# Patient Record
Sex: FEMALE | Race: OTHER | NOT HISPANIC OR LATINO | Employment: OTHER | URBAN - METROPOLITAN AREA
[De-identification: names, ages, dates, MRNs, and addresses within clinical notes are randomized per-mention and may not be internally consistent; named-entity substitution may affect disease eponyms.]

---

## 2023-03-06 PROBLEM — H04.569 PUNCTAL STENOSIS, ACQUIRED: Status: ACTIVE | Noted: 2023-03-06

## 2023-03-06 PROBLEM — M77.8 TENDONITIS OF ELBOW, RIGHT: Status: ACTIVE | Noted: 2023-03-06

## 2023-03-06 PROBLEM — S49.90XA SHOULDER INJURY: Status: ACTIVE | Noted: 2023-03-06

## 2023-03-06 PROBLEM — R60.0 LOWER LEG EDEMA: Status: ACTIVE | Noted: 2023-03-06

## 2023-03-06 PROBLEM — R30.0 DYSURIA: Status: ACTIVE | Noted: 2023-03-06

## 2023-03-06 PROBLEM — M25.519 SHOULDER PAIN WITH HISTORY OF REPAIR OF ROTATOR CUFF: Status: ACTIVE | Noted: 2023-03-06

## 2023-03-06 PROBLEM — R26.81 GAIT INSTABILITY: Status: ACTIVE | Noted: 2023-03-06

## 2023-03-06 PROBLEM — T14.8XXA MUSCLE STRAIN: Status: ACTIVE | Noted: 2023-03-06

## 2023-03-06 PROBLEM — E11.65 TYPE 2 DIABETES MELLITUS WITH HYPERGLYCEMIA, WITHOUT LONG-TERM CURRENT USE OF INSULIN (MULTI): Status: ACTIVE | Noted: 2023-03-06

## 2023-03-06 PROBLEM — H26.9 CATARACT: Status: ACTIVE | Noted: 2023-03-06

## 2023-03-06 PROBLEM — R05.9 COUGH: Status: ACTIVE | Noted: 2023-03-06

## 2023-03-06 PROBLEM — R39.15 URINARY URGENCY: Status: ACTIVE | Noted: 2023-03-06

## 2023-03-06 PROBLEM — R12 HEARTBURN: Status: ACTIVE | Noted: 2023-03-06

## 2023-03-06 PROBLEM — H52.209 HYPEROPIA WITH ASTIGMATISM AND PRESBYOPIA: Status: ACTIVE | Noted: 2023-03-06

## 2023-03-06 PROBLEM — R21 RASH AND NONSPECIFIC SKIN ERUPTION: Status: ACTIVE | Noted: 2023-03-06

## 2023-03-06 PROBLEM — M18.11 PRIMARY OSTEOARTHRITIS OF FIRST CARPOMETACARPAL JOINT OF RIGHT HAND: Status: ACTIVE | Noted: 2023-03-06

## 2023-03-06 PROBLEM — M25.511 CHRONIC RIGHT SHOULDER PAIN: Status: ACTIVE | Noted: 2023-03-06

## 2023-03-06 PROBLEM — I10 BENIGN ESSENTIAL HYPERTENSION: Status: ACTIVE | Noted: 2023-03-06

## 2023-03-06 PROBLEM — K57.32 DIVERTICULITIS, COLON: Status: ACTIVE | Noted: 2023-03-06

## 2023-03-06 PROBLEM — K59.00 CONSTIPATION: Status: ACTIVE | Noted: 2023-03-06

## 2023-03-06 PROBLEM — N23 KIDNEY PAIN: Status: ACTIVE | Noted: 2023-03-06

## 2023-03-06 PROBLEM — L29.9 ITCHY SKIN: Status: ACTIVE | Noted: 2023-03-06

## 2023-03-06 PROBLEM — M79.644 PAIN OF RIGHT THUMB: Status: ACTIVE | Noted: 2023-03-06

## 2023-03-06 PROBLEM — H02.60 XANTHELASMA OF EYELID: Status: ACTIVE | Noted: 2023-03-06

## 2023-03-06 PROBLEM — B37.9 CANDIDIASIS: Status: ACTIVE | Noted: 2023-03-06

## 2023-03-06 PROBLEM — H57.9 EYE PROBLEMS: Status: ACTIVE | Noted: 2023-03-06

## 2023-03-06 PROBLEM — S02.2XXA NASAL FRACTURE: Status: ACTIVE | Noted: 2023-03-06

## 2023-03-06 PROBLEM — M25.512 SHOULDER PAIN, LEFT: Status: ACTIVE | Noted: 2023-03-06

## 2023-03-06 PROBLEM — M25.562 LEFT KNEE PAIN: Status: ACTIVE | Noted: 2023-03-06

## 2023-03-06 PROBLEM — M25.612 STIFFNESS OF LEFT SHOULDER, NOT ELSEWHERE CLASSIFIED: Status: ACTIVE | Noted: 2023-03-06

## 2023-03-06 PROBLEM — S09.92XA NASAL INJURY: Status: ACTIVE | Noted: 2023-03-06

## 2023-03-06 PROBLEM — H53.9 VISUAL CHANGES: Status: ACTIVE | Noted: 2023-03-06

## 2023-03-06 PROBLEM — Z98.890 SHOULDER PAIN WITH HISTORY OF REPAIR OF ROTATOR CUFF: Status: ACTIVE | Noted: 2023-03-06

## 2023-03-06 PROBLEM — E78.5 HYPERLIPIDEMIA, MILD: Status: ACTIVE | Noted: 2023-03-06

## 2023-03-06 PROBLEM — E16.2 HYPOGLYCEMIA: Status: ACTIVE | Noted: 2023-03-06

## 2023-03-06 PROBLEM — H52.4 HYPEROPIA WITH ASTIGMATISM AND PRESBYOPIA: Status: ACTIVE | Noted: 2023-03-06

## 2023-03-06 PROBLEM — R10.9 ABDOMINAL PAIN: Status: ACTIVE | Noted: 2023-03-06

## 2023-03-06 PROBLEM — R26.2 DIFFICULTY WALKING: Status: ACTIVE | Noted: 2023-03-06

## 2023-03-06 PROBLEM — M75.02 ADHESIVE CAPSULITIS OF LEFT SHOULDER: Status: ACTIVE | Noted: 2023-03-06

## 2023-03-06 PROBLEM — H04.201 EPIPHORA, RIGHT: Status: ACTIVE | Noted: 2023-03-06

## 2023-03-06 PROBLEM — R92.8 ABNORMAL MAMMOGRAM: Status: ACTIVE | Noted: 2023-03-06

## 2023-03-06 PROBLEM — M75.102 ROTATOR CUFF TEAR, LEFT: Status: ACTIVE | Noted: 2023-03-06

## 2023-03-06 PROBLEM — H04.123 BILATERAL DRY EYES: Status: ACTIVE | Noted: 2023-03-06

## 2023-03-06 PROBLEM — R11.2 NAUSEA WITH VOMITING: Status: ACTIVE | Noted: 2023-03-06

## 2023-03-06 PROBLEM — H35.363 PERIPHERAL DRUSEN OF BOTH EYES: Status: ACTIVE | Noted: 2023-03-06

## 2023-03-06 PROBLEM — H31.8 CHOROIDAL LESION: Status: ACTIVE | Noted: 2023-03-06

## 2023-03-06 PROBLEM — H52.00 HYPEROPIA WITH ASTIGMATISM AND PRESBYOPIA: Status: ACTIVE | Noted: 2023-03-06

## 2023-03-06 PROBLEM — G89.29 CHRONIC RIGHT SHOULDER PAIN: Status: ACTIVE | Noted: 2023-03-06

## 2023-03-06 PROBLEM — H02.109 ECTROPION DUE TO LAXITY OF EYELID: Status: ACTIVE | Noted: 2023-03-06

## 2023-03-06 RX ORDER — CETIRIZINE HYDROCHLORIDE 10 MG/1
10 TABLET ORAL DAILY
COMMUNITY
End: 2023-06-15 | Stop reason: SDUPTHER

## 2023-03-06 RX ORDER — AMLODIPINE BESYLATE 5 MG/1
1 TABLET ORAL DAILY
COMMUNITY
End: 2023-04-17

## 2023-03-06 RX ORDER — ROSUVASTATIN CALCIUM 20 MG/1
1 TABLET, COATED ORAL NIGHTLY
COMMUNITY
Start: 2020-06-01 | End: 2023-05-10

## 2023-03-06 RX ORDER — GLYBURIDE 2.5 MG/1
2.5 TABLET ORAL 2 TIMES DAILY PRN
COMMUNITY
Start: 2020-11-27 | End: 2023-05-19

## 2023-03-06 RX ORDER — IBUPROFEN 200 MG
CAPSULE ORAL
COMMUNITY
Start: 2022-08-29 | End: 2023-07-28 | Stop reason: SDUPTHER

## 2023-03-06 RX ORDER — PANTOPRAZOLE SODIUM 40 MG/1
1 TABLET, DELAYED RELEASE ORAL DAILY
COMMUNITY
Start: 2021-05-13 | End: 2023-04-21

## 2023-03-06 RX ORDER — LANCETS
EACH MISCELLANEOUS
COMMUNITY
End: 2023-07-28 | Stop reason: SDUPTHER

## 2023-03-15 ENCOUNTER — TELEPHONE (OUTPATIENT)
Dept: PRIMARY CARE | Facility: CLINIC | Age: 73
End: 2023-03-15
Payer: MEDICAID

## 2023-03-16 ENCOUNTER — APPOINTMENT (OUTPATIENT)
Dept: PRIMARY CARE | Facility: CLINIC | Age: 73
End: 2023-03-16
Payer: MEDICAID

## 2023-04-16 DIAGNOSIS — I10 BENIGN ESSENTIAL HTN: Primary | ICD-10-CM

## 2023-04-17 RX ORDER — AMLODIPINE BESYLATE 5 MG/1
TABLET ORAL
Qty: 90 TABLET | Refills: 0 | Status: SHIPPED | OUTPATIENT
Start: 2023-04-17 | End: 2023-07-28 | Stop reason: SDUPTHER

## 2023-04-20 DIAGNOSIS — R12 HEARTBURN: Primary | ICD-10-CM

## 2023-04-21 RX ORDER — PANTOPRAZOLE SODIUM 40 MG/1
TABLET, DELAYED RELEASE ORAL
Qty: 90 TABLET | Refills: 0 | Status: SHIPPED | OUTPATIENT
Start: 2023-04-21 | End: 2023-06-15 | Stop reason: ALTCHOICE

## 2023-05-09 DIAGNOSIS — E78.5 HYPERLIPIDEMIA, MILD: Primary | ICD-10-CM

## 2023-05-10 RX ORDER — ROSUVASTATIN CALCIUM 20 MG/1
TABLET, COATED ORAL
Qty: 90 TABLET | Refills: 0 | Status: SHIPPED | OUTPATIENT
Start: 2023-05-10 | End: 2023-06-26

## 2023-05-18 DIAGNOSIS — E11.9 TYPE 2 DIABETES MELLITUS WITHOUT COMPLICATION, UNSPECIFIED WHETHER LONG TERM INSULIN USE (MULTI): Primary | ICD-10-CM

## 2023-05-19 RX ORDER — GLYBURIDE 2.5 MG/1
TABLET ORAL
Qty: 180 TABLET | Refills: 0 | Status: SHIPPED | OUTPATIENT
Start: 2023-05-19 | End: 2023-06-26

## 2023-06-15 ENCOUNTER — OFFICE VISIT (OUTPATIENT)
Dept: PRIMARY CARE | Facility: CLINIC | Age: 73
End: 2023-06-15
Payer: MEDICAID

## 2023-06-15 VITALS
WEIGHT: 164.6 LBS | OXYGEN SATURATION: 95 % | BODY MASS INDEX: 28.1 KG/M2 | HEART RATE: 60 BPM | HEIGHT: 64 IN | SYSTOLIC BLOOD PRESSURE: 145 MMHG | TEMPERATURE: 97.7 F | DIASTOLIC BLOOD PRESSURE: 76 MMHG

## 2023-06-15 DIAGNOSIS — Z00.00 HEALTHCARE MAINTENANCE: ICD-10-CM

## 2023-06-15 DIAGNOSIS — R12 HEARTBURN: ICD-10-CM

## 2023-06-15 DIAGNOSIS — R05.3 CHRONIC COUGH: Primary | ICD-10-CM
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
## 2023-06-15 PROCEDURE — 1159F MED LIST DOCD IN RCRD: CPT | Performed by: STUDENT IN AN ORGANIZED HEALTH CARE EDUCATION/TRAINING PROGRAM

## 2023-06-15 PROCEDURE — 99213 OFFICE O/P EST LOW 20 MIN: CPT | Performed by: STUDENT IN AN ORGANIZED HEALTH CARE EDUCATION/TRAINING PROGRAM

## 2023-06-15 PROCEDURE — 3078F DIAST BP <80 MM HG: CPT | Performed by: STUDENT IN AN ORGANIZED HEALTH CARE EDUCATION/TRAINING PROGRAM

## 2023-06-15 PROCEDURE — 1160F RVW MEDS BY RX/DR IN RCRD: CPT | Performed by: STUDENT IN AN ORGANIZED HEALTH CARE EDUCATION/TRAINING PROGRAM

## 2023-06-15 PROCEDURE — 1036F TOBACCO NON-USER: CPT | Performed by: STUDENT IN AN ORGANIZED HEALTH CARE EDUCATION/TRAINING PROGRAM

## 2023-06-15 PROCEDURE — 3077F SYST BP >= 140 MM HG: CPT | Performed by: STUDENT IN AN ORGANIZED HEALTH CARE EDUCATION/TRAINING PROGRAM

## 2023-06-15 RX ORDER — ALBUTEROL SULFATE 90 UG/1
2 AEROSOL, METERED RESPIRATORY (INHALATION) EVERY 6 HOURS PRN
Qty: 18 G | Refills: 0 | Status: SHIPPED | OUTPATIENT
Start: 2023-06-15 | End: 2023-06-15 | Stop reason: SDUPTHER

## 2023-06-15 RX ORDER — CETIRIZINE HYDROCHLORIDE 10 MG/1
10 TABLET ORAL DAILY
Qty: 90 TABLET | Refills: 1 | Status: SHIPPED | OUTPATIENT
Start: 2023-06-15 | End: 2023-07-28 | Stop reason: SDUPTHER

## 2023-06-15 RX ORDER — PANTOPRAZOLE SODIUM 40 MG/1
40 TABLET, DELAYED RELEASE ORAL 2 TIMES DAILY
Qty: 60 TABLET | Refills: 2 | Status: SHIPPED | OUTPATIENT
Start: 2023-06-15 | End: 2023-07-28 | Stop reason: SDUPTHER

## 2023-06-15 RX ORDER — ALBUTEROL SULFATE 90 UG/1
1-2 AEROSOL, METERED RESPIRATORY (INHALATION) EVERY 6 HOURS PRN
Qty: 18 G | Refills: 0 | Status: SHIPPED | OUTPATIENT
Start: 2023-06-15 | End: 2023-07-28 | Stop reason: SDUPTHER

## 2023-06-15 ASSESSMENT — ENCOUNTER SYMPTOMS
VOMITING: 0
DIZZINESS: 0
LIGHT-HEADEDNESS: 0
FEVER: 0
SHORTNESS OF BREATH: 1
COUGH: 1
NAUSEA: 1
DYSURIA: 0
WHEEZING: 0
DIAPHORESIS: 0
CHILLS: 0

## 2023-06-15 ASSESSMENT — PATIENT HEALTH QUESTIONNAIRE - PHQ9
SUM OF ALL RESPONSES TO PHQ9 QUESTIONS 1 AND 2: 0
2. FEELING DOWN, DEPRESSED OR HOPELESS: NOT AT ALL
1. LITTLE INTEREST OR PLEASURE IN DOING THINGS: NOT AT ALL

## 2023-06-15 NOTE — ASSESSMENT & PLAN NOTE
Symptoms seem most consistent with acid reflux versus gastritis versus developing ulcer. She is s/p appy and delfina. Less likely diverticulitis given location of symptoms.  Will increase pantoprazole to 40 mg twice a day.  Discussed to eat to brat diet and blander foods in the interim.  Advised to cut back on caffeine use.  She is already not using any NSAIDs, smoking, or using alcohol. She was comfortable seeing Dr. Dominguez and provided contact information to schedule.  Referral placed.  Advised ER for fevers, chills, sweats, altered mental status, melena, hematochezia, worsening abdominal pain.

## 2023-06-15 NOTE — ASSESSMENT & PLAN NOTE
X-ray of the chest ordered to evaluate cough given his longstanding.  Discussed to try taking Zyrtec 10 mg a day and appears that she was on this at some point.  We will also trial albuterol to help with cough as needed as there may be a component of reactive airway disease/asthma.  Discussed that cough may also be from acid reflux and perhaps increasing the pantoprazole may help with symptoms.  CT abdomen pelvis incidentally showed possible atelectasis versus consolidation in October 2023 and if chest x-ray is unremarkable we may need CT chest to further evaluate this.  Follow-up sooner than 3 months if symptoms persist.

## 2023-06-15 NOTE — PROGRESS NOTES
"Subjective   Patient ID: Najibeh Tannous is a 72 y.o. female who presents for Abdominal Pain.  She is having epigastric abdominal pain described as burning and radiating to the back. No foods associated with symptoms, they don't make better or worse. She drinks coffee. She doesn't eat spicy foods. She doesn't use NSAIDs. She is on protonix 40mg/day but hasn't been helping.    15 years ago she saw Dr. Dominguez at [facility] for this for similar symptoms and dx with ulcer. Feels pantoprazole hasn't been working. Feels since taking cholesterol medicine she has had some trouble breathing. Some nausea that's worse at night. No vomiting. No melena or hematochezia. BM regular. Takes rosuvastatin at night. Doesn't have GI doctor but saw Dr. Dominguez in the past. Coughing with deep breaths and has had this for a while but seems worse over last 3-4 months. Denies nasal congestion at the moment.         Review of Systems   Constitutional:  Negative for chills, diaphoresis and fever.   HENT:  Negative for hearing loss.    Eyes:  Negative for visual disturbance.   Respiratory:  Positive for cough and shortness of breath. Negative for wheezing.    Cardiovascular:  Negative for chest pain and leg swelling.   Gastrointestinal:  Positive for nausea. Negative for vomiting.   Genitourinary:  Negative for dysuria.   Neurological:  Negative for dizziness and light-headedness.       /76   Pulse 60   Temp 36.5 °C (97.7 °F)   Ht 1.626 m (5' 4\")   Wt 74.7 kg (164 lb 9.6 oz)   SpO2 95%   BMI 28.25 kg/m²     Objective   Physical Exam  Vitals reviewed.   HENT:      Head: Normocephalic.   Cardiovascular:      Rate and Rhythm: Normal rate and regular rhythm.   Pulmonary:      Effort: Pulmonary effort is normal. No respiratory distress.      Breath sounds: Normal breath sounds.   Abdominal:      General: Bowel sounds are normal. There is no distension.      Palpations: Abdomen is soft. There is no mass.      Tenderness: There is abdominal " tenderness (EPigastric). There is no guarding or rebound.      Comments: Negative mascorro sign, negative mcburney's point   Musculoskeletal:         General: No deformity.   Skin:     Coloration: Skin is not jaundiced.   Neurological:      Mental Status: She is alert.   Psychiatric:         Mood and Affect: Mood normal.         Behavior: Behavior normal.         Assessment/Plan   Problem List Items Addressed This Visit       Cough - Primary     X-ray of the chest ordered to evaluate cough given his longstanding.  Discussed to try taking Zyrtec 10 mg a day and appears that she was on this at some point.  We will also trial albuterol to help with cough as needed as there may be a component of reactive airway disease/asthma.  Discussed that cough may also be from acid reflux and perhaps increasing the pantoprazole may help with symptoms.  CT abdomen pelvis incidentally showed possible atelectasis versus consolidation in October 2023 and if chest x-ray is unremarkable we may need CT chest to further evaluate this.  Follow-up sooner than 3 months if symptoms persist.         Relevant Medications    cetirizine (ZyrTEC) 10 mg tablet    albuterol 90 mcg/actuation inhaler    Other Relevant Orders    XR chest 2 views    Heartburn     Symptoms seem most consistent with acid reflux versus gastritis versus developing ulcer. She is s/p appy and delfina. Less likely diverticulitis given location of symptoms.  Will increase pantoprazole to 40 mg twice a day.  Discussed to eat to brat diet and blander foods in the interim.  Advised to cut back on caffeine use.  She is already not using any NSAIDs, smoking, or using alcohol. She was comfortable seeing Dr. Dominguez and provided contact information to schedule.  Referral placed.  Advised ER for fevers, chills, sweats, altered mental status, melena, hematochezia, worsening abdominal pain.         Relevant Medications    pantoprazole (ProtoNix) 40 mg EC tablet    Healthcare maintenance      F/u 3 months CPE         Relevant Orders    Follow Up In [facility] - PCP

## 2023-06-19 ENCOUNTER — TELEPHONE (OUTPATIENT)
Dept: PRIMARY CARE | Facility: CLINIC | Age: 73
End: 2023-06-19
Payer: MEDICAID

## 2023-06-19 NOTE — TELEPHONE ENCOUNTER
----- Message from Tanvir Fierro DO sent at 6/16/2023  8:34 PM EDT -----  Please let her know her xray was normal. I'd like to see her back in 2 weeks to review cough and if her cough persists we will order CT chest. Yumiko can you help schedule a follow up with me or whoever is available in 2 weeks?

## 2023-06-20 ENCOUNTER — TELEPHONE (OUTPATIENT)
Dept: PRIMARY CARE | Facility: CLINIC | Age: 73
End: 2023-06-20
Payer: MEDICAID

## 2023-06-20 NOTE — TELEPHONE ENCOUNTER
----- Message from Cinthya Gomez sent at 6/20/2023 10:49 AM EDT -----  Can you call this pt and let her know we had to cancel appointment on 10/9/23 @10:00 am due to Dr. Faith is not available at that time. I rescheduled  her to Friday 10/20@11:30 am. If she can't do that day or time she will have to call us back.    Thanks

## 2023-06-24 DIAGNOSIS — E11.9 TYPE 2 DIABETES MELLITUS WITHOUT COMPLICATION, UNSPECIFIED WHETHER LONG TERM INSULIN USE (MULTI): ICD-10-CM

## 2023-06-24 DIAGNOSIS — E78.5 HYPERLIPIDEMIA, MILD: ICD-10-CM

## 2023-06-26 DIAGNOSIS — E11.65 TYPE 2 DIABETES MELLITUS WITH HYPERGLYCEMIA, WITHOUT LONG-TERM CURRENT USE OF INSULIN (MULTI): Primary | ICD-10-CM

## 2023-06-26 RX ORDER — GLYBURIDE 2.5 MG/1
TABLET ORAL
Qty: 30 TABLET | Refills: 2 | Status: SHIPPED | OUTPATIENT
Start: 2023-06-26 | End: 2023-08-22

## 2023-06-26 RX ORDER — ROSUVASTATIN CALCIUM 20 MG/1
TABLET, COATED ORAL
Qty: 30 TABLET | Refills: 2 | Status: SHIPPED | OUTPATIENT
Start: 2023-06-26 | End: 2023-07-28 | Stop reason: SDUPTHER

## 2023-07-28 DIAGNOSIS — E78.5 HYPERLIPIDEMIA, MILD: ICD-10-CM

## 2023-07-28 DIAGNOSIS — I10 BENIGN ESSENTIAL HTN: ICD-10-CM

## 2023-07-28 DIAGNOSIS — R12 HEARTBURN: ICD-10-CM

## 2023-07-28 DIAGNOSIS — E11.65 TYPE 2 DIABETES MELLITUS WITH HYPERGLYCEMIA, WITHOUT LONG-TERM CURRENT USE OF INSULIN (MULTI): Primary | ICD-10-CM

## 2023-07-28 DIAGNOSIS — R05.3 CHRONIC COUGH: ICD-10-CM

## 2023-07-28 RX ORDER — IBUPROFEN 200 MG
CAPSULE ORAL
Qty: 90 STRIP | Refills: 0 | Status: SHIPPED | OUTPATIENT
Start: 2023-07-28 | End: 2023-08-02

## 2023-07-28 RX ORDER — ALBUTEROL SULFATE 90 UG/1
1-2 AEROSOL, METERED RESPIRATORY (INHALATION) EVERY 6 HOURS PRN
Qty: 18 G | Refills: 0 | Status: SHIPPED | OUTPATIENT
Start: 2023-07-28 | End: 2023-08-02

## 2023-07-28 RX ORDER — PANTOPRAZOLE SODIUM 40 MG/1
40 TABLET, DELAYED RELEASE ORAL 2 TIMES DAILY
Qty: 60 TABLET | Refills: 2 | Status: SHIPPED | OUTPATIENT
Start: 2023-07-28 | End: 2023-09-25

## 2023-07-28 RX ORDER — LANCETS
EACH MISCELLANEOUS
Qty: 90 EACH | Refills: 0 | Status: SHIPPED | OUTPATIENT
Start: 2023-07-28 | End: 2023-08-02

## 2023-07-28 RX ORDER — AMLODIPINE BESYLATE 5 MG/1
5 TABLET ORAL DAILY
Qty: 90 TABLET | Refills: 0 | Status: SHIPPED | OUTPATIENT
Start: 2023-07-28 | End: 2023-08-02

## 2023-07-28 RX ORDER — ROSUVASTATIN CALCIUM 20 MG/1
20 TABLET, COATED ORAL DAILY
Qty: 90 TABLET | Refills: 0 | Status: SHIPPED | OUTPATIENT
Start: 2023-07-28 | End: 2023-12-18

## 2023-07-28 RX ORDER — CETIRIZINE HYDROCHLORIDE 10 MG/1
10 TABLET ORAL DAILY
Qty: 90 TABLET | Refills: 1 | Status: SHIPPED | OUTPATIENT
Start: 2023-07-28 | End: 2023-12-07

## 2023-07-31 ENCOUNTER — PATIENT OUTREACH (OUTPATIENT)
Dept: CARE COORDINATION | Facility: CLINIC | Age: 73
End: 2023-07-31
Payer: MEDICAID

## 2023-07-31 NOTE — PROGRESS NOTES
Discharge Facility:[facility]  Discharge Diagnosis:W19.XXXA accidental fall, S09.93XA Facial Trauma, I16.0 Hypertensive Urgency   Admission Date:7/28/2023  Discharge Date: 7/28/2023    PCP Appointment Date:Pending  Specialist Appointment Date: 8/1/2023 Dr. Armendariz/ENT   Hospital Encounter and Summary: Linked   See discharge assessment below for further details  Engagement  Call Start Time: 1542 (7/31/2023  3:42 PM)    Medications  Medications reviewed with patient/caregiver?: Yes (Keflex 500 mg one bid x 5 days, Lisinopril 5 mg one qd, Voltarin gel 1 % apply tid to hand, Mupirocin 2 % ointment tid to face injury, Oxycodone 5 mg one q 8 hours prn) (7/31/2023  3:42 PM)  Is the patient having any side effects they believe may be caused by any medication additions or changes?: No (7/31/2023  3:42 PM)  Does the patient have all medications ordered at discharge?: Yes (7/31/2023  3:42 PM)  Care Management Interventions: No intervention needed (7/31/2023  3:42 PM)  Is the patient taking all medications as directed (includes completed medication regime)?: Yes (7/31/2023  3:42 PM)    Appointments  Does the patient have a primary care provider?: Yes (7/31/2023  3:42 PM)  Has the patient kept scheduled appointments due by today?: Yes (7/31/2023  3:42 PM)    Self Management  What is the home health agency?: -- (n/a) (7/31/2023  3:42 PM)  What Durable Medical Equipment (DME) was ordered?: -- (n/a) (7/31/2023  3:42 PM)    Patient Teaching  Does the patient have access to their discharge instructions?: Yes (7/31/2023  3:42 PM)  What is the patient's perception of their health status since discharge?: Improving (some bruising to injured area but doing well.) (7/31/2023  3:42 PM)  Is the patient/caregiver able to teach back the hierarchy of who to call/visit for symptoms/problems? PCP, Specialist, Home Health nurse, Urgent Care, ED, 911: Yes (7/31/2023  3:42 PM)

## 2023-08-02 DIAGNOSIS — I10 BENIGN ESSENTIAL HTN: ICD-10-CM

## 2023-08-02 DIAGNOSIS — M79.641 RIGHT HAND PAIN: ICD-10-CM

## 2023-08-02 DIAGNOSIS — R05.3 CHRONIC COUGH: ICD-10-CM

## 2023-08-02 DIAGNOSIS — S09.93XD FACIAL TRAUMA, SUBSEQUENT ENCOUNTER: Primary | ICD-10-CM

## 2023-08-02 DIAGNOSIS — E11.65 TYPE 2 DIABETES MELLITUS WITH HYPERGLYCEMIA, WITHOUT LONG-TERM CURRENT USE OF INSULIN (MULTI): ICD-10-CM

## 2023-08-02 RX ORDER — MUPIROCIN 20 MG/G
OINTMENT TOPICAL
Qty: 22 G | Refills: 0 | Status: SHIPPED | OUTPATIENT
Start: 2023-08-02 | End: 2023-09-11

## 2023-08-02 RX ORDER — ACETAMINOPHEN 500 MG
TABLET ORAL
Qty: 90 TABLET | Refills: 0 | Status: SHIPPED | OUTPATIENT
Start: 2023-08-02 | End: 2023-08-31

## 2023-08-02 RX ORDER — ALBUTEROL SULFATE 90 UG/1
1-2 AEROSOL, METERED RESPIRATORY (INHALATION) EVERY 6 HOURS PRN
Qty: 18 G | Refills: 0 | Status: SHIPPED | OUTPATIENT
Start: 2023-08-02 | End: 2023-08-31

## 2023-08-02 RX ORDER — BLOOD SUGAR DIAGNOSTIC
STRIP MISCELLANEOUS
Qty: 100 STRIP | Refills: 0 | Status: SHIPPED | OUTPATIENT
Start: 2023-08-02 | End: 2023-11-02

## 2023-08-02 RX ORDER — AMLODIPINE BESYLATE 5 MG/1
5 TABLET ORAL DAILY
Qty: 90 TABLET | Refills: 0 | Status: SHIPPED | OUTPATIENT
Start: 2023-08-02 | End: 2023-11-02

## 2023-08-02 RX ORDER — LANCETS
EACH MISCELLANEOUS
Qty: 100 EACH | Refills: 0 | Status: SHIPPED | OUTPATIENT
Start: 2023-08-02 | End: 2023-08-31

## 2023-08-02 RX ORDER — DICLOFENAC SODIUM 10 MG/G
GEL TOPICAL
Qty: 100 G | Refills: 0 | Status: SHIPPED | OUTPATIENT
Start: 2023-08-02 | End: 2023-08-31

## 2023-08-02 RX ORDER — LISINOPRIL 5 MG/1
5 TABLET ORAL DAILY
Qty: 30 TABLET | Refills: 0 | Status: SHIPPED | OUTPATIENT
Start: 2023-08-02 | End: 2023-08-31

## 2023-08-21 DIAGNOSIS — E11.9 TYPE 2 DIABETES MELLITUS WITHOUT COMPLICATION, UNSPECIFIED WHETHER LONG TERM INSULIN USE (MULTI): ICD-10-CM

## 2023-08-22 RX ORDER — GLYBURIDE 2.5 MG/1
2.5 TABLET ORAL DAILY
Qty: 90 TABLET | Refills: 0 | Status: SHIPPED | OUTPATIENT
Start: 2023-08-22 | End: 2023-11-16 | Stop reason: SDUPTHER

## 2023-08-31 DIAGNOSIS — M79.641 RIGHT HAND PAIN: ICD-10-CM

## 2023-08-31 DIAGNOSIS — S09.93XD FACIAL TRAUMA, SUBSEQUENT ENCOUNTER: ICD-10-CM

## 2023-08-31 DIAGNOSIS — I10 BENIGN ESSENTIAL HTN: ICD-10-CM

## 2023-08-31 DIAGNOSIS — R05.3 CHRONIC COUGH: ICD-10-CM

## 2023-08-31 DIAGNOSIS — E11.65 TYPE 2 DIABETES MELLITUS WITH HYPERGLYCEMIA, WITHOUT LONG-TERM CURRENT USE OF INSULIN (MULTI): ICD-10-CM

## 2023-08-31 RX ORDER — MUPIROCIN 20 MG/G
OINTMENT TOPICAL
Qty: 22 G | Refills: 0 | OUTPATIENT
Start: 2023-08-31

## 2023-08-31 RX ORDER — LISINOPRIL 5 MG/1
5 TABLET ORAL DAILY
Qty: 90 TABLET | Refills: 3 | Status: SHIPPED | OUTPATIENT
Start: 2023-08-31 | End: 2024-04-03 | Stop reason: WASHOUT

## 2023-08-31 RX ORDER — BLOOD-GLUCOSE CONTROL, NORMAL
EACH MISCELLANEOUS
Qty: 100 EACH | Refills: 3 | Status: SHIPPED | OUTPATIENT
Start: 2023-08-31 | End: 2024-05-21 | Stop reason: SDUPTHER

## 2023-08-31 RX ORDER — ALBUTEROL SULFATE 90 UG/1
1-2 AEROSOL, METERED RESPIRATORY (INHALATION) EVERY 6 HOURS PRN
Qty: 18 G | Refills: 0 | Status: SHIPPED | OUTPATIENT
Start: 2023-08-31 | End: 2023-09-25

## 2023-08-31 RX ORDER — ACETAMINOPHEN 500 MG
1000 TABLET ORAL 3 TIMES DAILY
Qty: 90 TABLET | Refills: 0 | Status: SHIPPED | OUTPATIENT
Start: 2023-08-31 | End: 2023-09-25

## 2023-08-31 RX ORDER — DICLOFENAC SODIUM 10 MG/G
4 GEL TOPICAL 3 TIMES DAILY
Qty: 100 G | Refills: 0 | Status: SHIPPED | OUTPATIENT
Start: 2023-08-31 | End: 2023-09-25

## 2023-09-09 DIAGNOSIS — S09.93XD FACIAL TRAUMA, SUBSEQUENT ENCOUNTER: ICD-10-CM

## 2023-09-11 RX ORDER — MUPIROCIN 20 MG/G
OINTMENT TOPICAL
Qty: 22 G | Refills: 0 | Status: SHIPPED | OUTPATIENT
Start: 2023-09-11 | End: 2023-09-25

## 2023-09-23 DIAGNOSIS — M79.641 RIGHT HAND PAIN: ICD-10-CM

## 2023-09-23 DIAGNOSIS — R12 HEARTBURN: ICD-10-CM

## 2023-09-23 DIAGNOSIS — R05.3 CHRONIC COUGH: ICD-10-CM

## 2023-09-23 DIAGNOSIS — S09.93XD FACIAL TRAUMA, SUBSEQUENT ENCOUNTER: ICD-10-CM

## 2023-09-25 RX ORDER — ACETAMINOPHEN 500 MG
1000 TABLET ORAL 3 TIMES DAILY
Qty: 90 TABLET | Refills: 0 | Status: SHIPPED | OUTPATIENT
Start: 2023-09-25 | End: 2023-11-02

## 2023-09-25 RX ORDER — MUPIROCIN 20 MG/G
OINTMENT TOPICAL
Qty: 22 G | Refills: 0 | Status: SHIPPED | OUTPATIENT
Start: 2023-09-25 | End: 2023-11-02

## 2023-09-25 RX ORDER — PANTOPRAZOLE SODIUM 40 MG/1
40 TABLET, DELAYED RELEASE ORAL 2 TIMES DAILY
Qty: 60 TABLET | Refills: 2 | Status: SHIPPED | OUTPATIENT
Start: 2023-09-25 | End: 2023-12-18

## 2023-09-25 RX ORDER — ALBUTEROL SULFATE 90 UG/1
1-2 AEROSOL, METERED RESPIRATORY (INHALATION) EVERY 6 HOURS PRN
Qty: 18 G | Refills: 0 | Status: SHIPPED | OUTPATIENT
Start: 2023-09-25 | End: 2023-10-26

## 2023-09-25 RX ORDER — DICLOFENAC SODIUM 10 MG/G
4 GEL TOPICAL 3 TIMES DAILY
Qty: 100 G | Refills: 0 | Status: SHIPPED | OUTPATIENT
Start: 2023-09-25 | End: 2023-10-26

## 2023-09-27 ENCOUNTER — PATIENT OUTREACH (OUTPATIENT)
Dept: PRIMARY CARE | Facility: CLINIC | Age: 73
End: 2023-09-27
Payer: MEDICAID

## 2023-09-27 NOTE — PROGRESS NOTES
Unable to reach patient for call back after patient's follow up appointment with PCP.   MARCOSM with call back number for patient to call if needed   If no voicemail available call attempts x 2 were made to contact the patient to assist with any questions or concerns patient may have.

## 2023-10-03 ENCOUNTER — TELEPHONE (OUTPATIENT)
Dept: PRIMARY CARE | Facility: CLINIC | Age: 73
End: 2023-10-03
Payer: MEDICAID

## 2023-10-03 NOTE — TELEPHONE ENCOUNTER
The pharmacist called to confirm the directions for the Glyburide for the patient. He shows once daily please confirm.

## 2023-10-09 ENCOUNTER — APPOINTMENT (OUTPATIENT)
Dept: PRIMARY CARE | Facility: CLINIC | Age: 73
End: 2023-10-09
Payer: MEDICAID

## 2023-10-20 ENCOUNTER — OFFICE VISIT (OUTPATIENT)
Dept: PRIMARY CARE | Facility: CLINIC | Age: 73
End: 2023-10-20
Payer: MEDICAID

## 2023-10-20 ENCOUNTER — LAB (OUTPATIENT)
Dept: LAB | Facility: LAB | Age: 73
End: 2023-10-20
Payer: MEDICAID

## 2023-10-20 VITALS
HEART RATE: 64 BPM | SYSTOLIC BLOOD PRESSURE: 118 MMHG | BODY MASS INDEX: 24.96 KG/M2 | OXYGEN SATURATION: 97 % | TEMPERATURE: 97.2 F | WEIGHT: 159.2 LBS | RESPIRATION RATE: 16 BRPM | DIASTOLIC BLOOD PRESSURE: 84 MMHG

## 2023-10-20 DIAGNOSIS — E78.5 HYPERLIPIDEMIA, MILD: ICD-10-CM

## 2023-10-20 DIAGNOSIS — R25.2 MUSCLE CRAMPS: ICD-10-CM

## 2023-10-20 DIAGNOSIS — E11.65 TYPE 2 DIABETES MELLITUS WITH HYPERGLYCEMIA, WITHOUT LONG-TERM CURRENT USE OF INSULIN (MULTI): ICD-10-CM

## 2023-10-20 DIAGNOSIS — E11.65 TYPE 2 DIABETES MELLITUS WITH HYPERGLYCEMIA, WITHOUT LONG-TERM CURRENT USE OF INSULIN (MULTI): Primary | ICD-10-CM

## 2023-10-20 DIAGNOSIS — I10 BENIGN ESSENTIAL HYPERTENSION: ICD-10-CM

## 2023-10-20 DIAGNOSIS — Z00.00 HEALTHCARE MAINTENANCE: ICD-10-CM

## 2023-10-20 PROBLEM — R10.13 DYSPEPSIA: Status: ACTIVE | Noted: 2017-07-05

## 2023-10-20 PROBLEM — N39.0 ACUTE UTI: Status: ACTIVE | Noted: 2023-10-20

## 2023-10-20 PROBLEM — I34.0 MITRAL VALVE INSUFFICIENCY: Status: ACTIVE | Noted: 2019-08-01

## 2023-10-20 PROBLEM — R53.83 FATIGUE: Status: ACTIVE | Noted: 2019-08-01

## 2023-10-20 PROBLEM — R10.31 ACUTE RIGHT LOWER QUADRANT PAIN: Status: ACTIVE | Noted: 2023-10-20

## 2023-10-20 PROBLEM — S09.92XA NASAL INJURY: Status: RESOLVED | Noted: 2023-03-06 | Resolved: 2023-10-20

## 2023-10-20 PROBLEM — M48.062 LUMBAR STENOSIS WITH NEUROGENIC CLAUDICATION: Status: ACTIVE | Noted: 2019-08-13

## 2023-10-20 PROBLEM — I07.1 TRICUSPID VALVE INSUFFICIENCY: Status: ACTIVE | Noted: 2019-08-01

## 2023-10-20 PROBLEM — H10.10 ALLERGIC CONJUNCTIVITIS: Status: ACTIVE | Noted: 2023-10-20

## 2023-10-20 PROBLEM — R42 DIZZINESS: Status: ACTIVE | Noted: 2017-02-25

## 2023-10-20 PROBLEM — N87.9 CERVICAL DYSPLASIA: Status: ACTIVE | Noted: 2018-04-23

## 2023-10-20 LAB
25(OH)D3 SERPL-MCNC: 16 NG/ML (ref 30–100)
ALBUMIN SERPL BCP-MCNC: 4.4 G/DL (ref 3.4–5)
ALP SERPL-CCNC: 67 U/L (ref 33–136)
ALT SERPL W P-5'-P-CCNC: 11 U/L (ref 7–45)
ANION GAP SERPL CALC-SCNC: 15 MMOL/L (ref 10–20)
AST SERPL W P-5'-P-CCNC: 17 U/L (ref 9–39)
BILIRUB SERPL-MCNC: 0.7 MG/DL (ref 0–1.2)
BUN SERPL-MCNC: 11 MG/DL (ref 6–23)
CALCIUM SERPL-MCNC: 9.9 MG/DL (ref 8.6–10.3)
CHLORIDE SERPL-SCNC: 103 MMOL/L (ref 98–107)
CHOLEST SERPL-MCNC: 135 MG/DL (ref 0–199)
CHOLESTEROL/HDL RATIO: 3
CO2 SERPL-SCNC: 27 MMOL/L (ref 21–32)
CREAT SERPL-MCNC: 0.77 MG/DL (ref 0.5–1.05)
CREAT UR-MCNC: 37.1 MG/DL (ref 20–320)
ERYTHROCYTE [DISTWIDTH] IN BLOOD BY AUTOMATED COUNT: 14.7 % (ref 11.5–14.5)
EST. AVERAGE GLUCOSE BLD GHB EST-MCNC: 134 MG/DL
GFR SERPL CREATININE-BSD FRML MDRD: 82 ML/MIN/1.73M*2
GLUCOSE SERPL-MCNC: 58 MG/DL (ref 74–99)
HBA1C MFR BLD: 6.3 %
HCT VFR BLD AUTO: 40.1 % (ref 36–46)
HDLC SERPL-MCNC: 44.5 MG/DL
HGB BLD-MCNC: 12.4 G/DL (ref 12–16)
LDLC SERPL CALC-MCNC: 63 MG/DL
MCH RBC QN AUTO: 25.3 PG (ref 26–34)
MCHC RBC AUTO-ENTMCNC: 30.9 G/DL (ref 32–36)
MCV RBC AUTO: 82 FL (ref 80–100)
MICROALBUMIN UR-MCNC: 106.2 MG/L
MICROALBUMIN/CREAT UR: 286.3 UG/MG CREAT
NON HDL CHOLESTEROL: 91 MG/DL (ref 0–149)
NRBC BLD-RTO: 0 /100 WBCS (ref 0–0)
PLATELET # BLD AUTO: 169 X10*3/UL (ref 150–450)
PMV BLD AUTO: 13.5 FL (ref 7.5–11.5)
POC HEMOGLOBIN A1C: 6.3 % (ref 4.2–6.5)
POTASSIUM SERPL-SCNC: 4.6 MMOL/L (ref 3.5–5.3)
PROT SERPL-MCNC: 7.8 G/DL (ref 6.4–8.2)
RBC # BLD AUTO: 4.9 X10*6/UL (ref 4–5.2)
SODIUM SERPL-SCNC: 140 MMOL/L (ref 136–145)
TRIGL SERPL-MCNC: 137 MG/DL (ref 0–149)
TSH SERPL-ACNC: 0.72 MIU/L (ref 0.44–3.98)
VIT B12 SERPL-MCNC: 187 PG/ML (ref 211–911)
VLDL: 27 MG/DL (ref 0–40)
WBC # BLD AUTO: 6.5 X10*3/UL (ref 4.4–11.3)

## 2023-10-20 PROCEDURE — 90686 IIV4 VACC NO PRSV 0.5 ML IM: CPT | Performed by: INTERNAL MEDICINE

## 2023-10-20 PROCEDURE — 85027 COMPLETE CBC AUTOMATED: CPT

## 2023-10-20 PROCEDURE — 82570 ASSAY OF URINE CREATININE: CPT

## 2023-10-20 PROCEDURE — 82306 VITAMIN D 25 HYDROXY: CPT

## 2023-10-20 PROCEDURE — 90471 IMMUNIZATION ADMIN: CPT | Performed by: INTERNAL MEDICINE

## 2023-10-20 PROCEDURE — 82607 VITAMIN B-12: CPT

## 2023-10-20 PROCEDURE — 1159F MED LIST DOCD IN RCRD: CPT | Performed by: INTERNAL MEDICINE

## 2023-10-20 PROCEDURE — 84443 ASSAY THYROID STIM HORMONE: CPT

## 2023-10-20 PROCEDURE — 1160F RVW MEDS BY RX/DR IN RCRD: CPT | Performed by: INTERNAL MEDICINE

## 2023-10-20 PROCEDURE — 1036F TOBACCO NON-USER: CPT | Performed by: INTERNAL MEDICINE

## 2023-10-20 PROCEDURE — 99214 OFFICE O/P EST MOD 30 MIN: CPT | Performed by: INTERNAL MEDICINE

## 2023-10-20 PROCEDURE — 4010F ACE/ARB THERAPY RXD/TAKEN: CPT | Performed by: INTERNAL MEDICINE

## 2023-10-20 PROCEDURE — 1125F AMNT PAIN NOTED PAIN PRSNT: CPT | Performed by: INTERNAL MEDICINE

## 2023-10-20 PROCEDURE — 83036 HEMOGLOBIN GLYCOSYLATED A1C: CPT

## 2023-10-20 PROCEDURE — 83036 HEMOGLOBIN GLYCOSYLATED A1C: CPT | Performed by: INTERNAL MEDICINE

## 2023-10-20 PROCEDURE — 36415 COLL VENOUS BLD VENIPUNCTURE: CPT

## 2023-10-20 PROCEDURE — 80053 COMPREHEN METABOLIC PANEL: CPT

## 2023-10-20 PROCEDURE — 3079F DIAST BP 80-89 MM HG: CPT | Performed by: INTERNAL MEDICINE

## 2023-10-20 PROCEDURE — 3074F SYST BP LT 130 MM HG: CPT | Performed by: INTERNAL MEDICINE

## 2023-10-20 PROCEDURE — 80061 LIPID PANEL: CPT

## 2023-10-20 PROCEDURE — 82043 UR ALBUMIN QUANTITATIVE: CPT

## 2023-10-20 ASSESSMENT — ENCOUNTER SYMPTOMS
COUGH: 0
ABDOMINAL PAIN: 0
DYSPHORIC MOOD: 0
FREQUENCY: 0
UNEXPECTED WEIGHT CHANGE: 0
FEVER: 0
DYSURIA: 0
CHEST TIGHTNESS: 0
NAUSEA: 0
SORE THROAT: 0
RHINORRHEA: 0
CHILLS: 0
BLOOD IN STOOL: 0
PALPITATIONS: 0
POLYDIPSIA: 0
SHORTNESS OF BREATH: 0
HEMATURIA: 0
MYALGIAS: 0
DIZZINESS: 0
ARTHRALGIAS: 1
EYE PAIN: 0
WHEEZING: 0
WOUND: 0
NERVOUS/ANXIOUS: 0
VOMITING: 0
HEADACHES: 0
DIARRHEA: 0
NUMBNESS: 1
CONSTIPATION: 0
POLYPHAGIA: 0

## 2023-10-20 NOTE — PROGRESS NOTES
Subjective   Patient ID: Najibeh Tannous is a 72 y.o. female who presents for Follow-up and Flu Vaccine (If okayed).    HPI     Here for followup  Has arthritis pain but ok  Takes 2 meds for diabetes but unsure what    She gets numbness in hands and feet from time to time and affects balance  She states no falls since summer  She fractured her nose  Declines PT  Cares for grandkids and states does not have time  Does not want to do PT    She would like flu shot    Review of Systems   Constitutional:  Negative for chills, fever and unexpected weight change.   HENT:  Negative for congestion, hearing loss, rhinorrhea and sore throat.    Eyes:  Negative for pain and visual disturbance.   Respiratory:  Negative for cough, chest tightness, shortness of breath and wheezing.    Cardiovascular:  Negative for chest pain and palpitations.   Gastrointestinal:  Negative for abdominal pain, blood in stool, constipation, diarrhea, nausea and vomiting.   Endocrine: Negative for cold intolerance, heat intolerance, polydipsia and polyphagia.   Genitourinary:  Negative for dysuria, frequency and hematuria.   Musculoskeletal:  Positive for arthralgias. Negative for myalgias.   Skin:  Negative for rash and wound.   Neurological:  Positive for numbness. Negative for dizziness, syncope and headaches.   Psychiatric/Behavioral:  Negative for dysphoric mood. The patient is not nervous/anxious.        Objective   /84   Pulse 64   Temp 36.2 °C (97.2 °F)   Resp 16   Wt 72.2 kg (159 lb 3.2 oz)   SpO2 97%   BMI 24.96 kg/m²     Physical Exam  Constitutional:       Appearance: Normal appearance.   Cardiovascular:      Rate and Rhythm: Normal rate and regular rhythm.      Heart sounds: Normal heart sounds. No murmur heard.     No gallop.   Pulmonary:      Effort: Pulmonary effort is normal. No respiratory distress.      Breath sounds: Normal breath sounds.   Musculoskeletal:      Right lower leg: No edema.      Left lower leg: No edema.    Neurological:      Mental Status: She is alert.         Assessment/Plan   Problem List Items Addressed This Visit             ICD-10-CM    Type 2 diabetes mellitus with hyperglycemia, without long-term current use of insulin (CMS/[facility], located [facility]) - Primary E11.65    Relevant Orders    POCT glycosylated hemoglobin (Hb A1C) manually resulted    Healthcare maintenance Z00.00   Neuropathy  -check b12  -likely from dm2 and spine  -discussed PT and assistive devices-declines     Hx of spinal stenosis s/p L4/L5 laminectomy (8/19)  MRI spine 1/31/20: postsurgical changes, re-demonstrated lumbar spondylosis and grade 1 L4/L5 anterolitsthesis with moderate narrowing.     Hypertension: controlled on Norvasc     ELbow tendonitis: stop knitting, compression sleeve     Diabetes type 2: a1c=5.8--> 6.3--> 6.5--> 6.0--> 6.3  -we are going to call and verify meds  <130 as she is having low sugars  -actos with edema  -januvia caused stomach issues  -does not want metformin  -a1c 3 months       Hyperlipidemia: has had issues with statins  -uncontrolled--was not taking, continue crestor  -work on diet  -check        Right thumb trapeziectomy 6/2020; seeing ortho  -voltaren gel as needed--did not work  -advised to see ortho  -cannot do po NSAIDs with allergy  -saw ortho and told to do PT only (not not finished)  -limited with meds and cannot do NSAIDs. Tramadol does not help  -states now fine     S/p rotator cuff repair: hurts with fall-advised to go to Dr. Harris     GERD: controlled on PPI     Itchy skin: send to derm, trial steroid     Eye issues: on eye drops per eye doc     f/u 3-4months with a1c.     Health Maintenance  -Pap smear: >65  -Vaccinations: Pneumovax UTD, Declines shingrix. ADvised tdap. Flu today  -Mammogram: 7/21- normal  -Colonoscopy: 7/5/17--repeat 10 years ([facility] Dominguez--got records)  -DEXA: 8/20-osteopenia, repeat 2 years (8/22)

## 2023-10-22 DIAGNOSIS — E55.9 VITAMIN D DEFICIENCY: Primary | ICD-10-CM

## 2023-10-22 DIAGNOSIS — E53.8 B12 DEFICIENCY: ICD-10-CM

## 2023-10-22 RX ORDER — ACETAMINOPHEN, DIPHENHYDRAMINE HCL, PHENYLEPHRINE HCL 325; 25; 5 MG/1; MG/1; MG/1
5000 TABLET ORAL DAILY
Qty: 30 TABLET | Refills: 2 | Status: SHIPPED | OUTPATIENT
Start: 2023-10-22 | End: 2023-12-18

## 2023-10-22 RX ORDER — ERGOCALCIFEROL 1.25 MG/1
50000 CAPSULE ORAL
Qty: 12 CAPSULE | Refills: 0 | Status: SHIPPED | OUTPATIENT
Start: 2023-10-22 | End: 2023-10-24

## 2023-10-23 ENCOUNTER — TELEPHONE (OUTPATIENT)
Dept: PRIMARY CARE | Facility: CLINIC | Age: 73
End: 2023-10-23
Payer: MEDICAID

## 2023-10-23 NOTE — TELEPHONE ENCOUNTER
----- Message from Becca Faith MD sent at 10/22/2023  3:50 PM EDT -----  Will you let her know her labs are ok except low vitamin D and b12. I am sending vitamin D weekly fo her to take and a b12 pill daily. She can also come in and do b12 shots if she would rather--this would have the tingling and energy level

## 2023-10-24 DIAGNOSIS — E55.9 VITAMIN D DEFICIENCY: ICD-10-CM

## 2023-10-24 RX ORDER — ERGOCALCIFEROL 1.25 MG/1
50000 CAPSULE ORAL
Qty: 12 CAPSULE | Refills: 0 | Status: SHIPPED | OUTPATIENT
Start: 2023-10-24 | End: 2024-01-15

## 2023-10-25 DIAGNOSIS — R05.3 CHRONIC COUGH: ICD-10-CM

## 2023-10-25 DIAGNOSIS — M79.641 RIGHT HAND PAIN: ICD-10-CM

## 2023-10-26 RX ORDER — ALBUTEROL SULFATE 90 UG/1
1-2 AEROSOL, METERED RESPIRATORY (INHALATION) EVERY 6 HOURS PRN
Qty: 18 G | Refills: 0 | Status: SHIPPED | OUTPATIENT
Start: 2023-10-26 | End: 2023-11-20

## 2023-10-26 RX ORDER — DICLOFENAC SODIUM 10 MG/G
4 GEL TOPICAL 3 TIMES DAILY
Qty: 100 G | Refills: 0 | Status: SHIPPED | OUTPATIENT
Start: 2023-10-26 | End: 2023-11-02

## 2023-10-31 ENCOUNTER — PATIENT OUTREACH (OUTPATIENT)
Dept: PRIMARY CARE | Facility: CLINIC | Age: 73
End: 2023-10-31
Payer: MEDICAID

## 2023-10-31 NOTE — PROGRESS NOTES
Patient has met target of no readmission for (90) days post hospital discharge and is graduated from Transitional Care Management program at this time.  Her daughter states she is doing well and no concerns at this time.

## 2023-11-01 DIAGNOSIS — S09.93XD FACIAL TRAUMA, SUBSEQUENT ENCOUNTER: ICD-10-CM

## 2023-11-01 DIAGNOSIS — I10 BENIGN ESSENTIAL HTN: ICD-10-CM

## 2023-11-01 DIAGNOSIS — M79.641 RIGHT HAND PAIN: ICD-10-CM

## 2023-11-01 DIAGNOSIS — E11.65 TYPE 2 DIABETES MELLITUS WITH HYPERGLYCEMIA, WITHOUT LONG-TERM CURRENT USE OF INSULIN (MULTI): ICD-10-CM

## 2023-11-02 RX ORDER — BLOOD SUGAR DIAGNOSTIC
STRIP MISCELLANEOUS
Qty: 100 STRIP | Refills: 3 | Status: SHIPPED | OUTPATIENT
Start: 2023-11-02 | End: 2024-05-21 | Stop reason: SDUPTHER

## 2023-11-02 RX ORDER — DICLOFENAC SODIUM 10 MG/G
GEL TOPICAL
Qty: 100 G | Refills: 0 | Status: SHIPPED | OUTPATIENT
Start: 2023-11-02 | End: 2023-12-18

## 2023-11-02 RX ORDER — MUPIROCIN 20 MG/G
OINTMENT TOPICAL
Qty: 22 G | Refills: 0 | Status: SHIPPED | OUTPATIENT
Start: 2023-11-02 | End: 2023-11-20

## 2023-11-02 RX ORDER — ACETAMINOPHEN 500 MG
1000 TABLET ORAL 3 TIMES DAILY
Qty: 90 TABLET | Refills: 0 | Status: SHIPPED | OUTPATIENT
Start: 2023-11-02 | End: 2023-11-20

## 2023-11-02 RX ORDER — AMLODIPINE BESYLATE 5 MG/1
5 TABLET ORAL DAILY
Qty: 90 TABLET | Refills: 1 | Status: SHIPPED | OUTPATIENT
Start: 2023-11-02 | End: 2024-04-03 | Stop reason: SDUPTHER

## 2023-11-16 DIAGNOSIS — E11.9 TYPE 2 DIABETES MELLITUS WITHOUT COMPLICATION, UNSPECIFIED WHETHER LONG TERM INSULIN USE (MULTI): ICD-10-CM

## 2023-11-16 RX ORDER — GLYBURIDE 2.5 MG/1
5 TABLET ORAL DAILY
Qty: 180 TABLET | Refills: 1 | Status: SHIPPED | OUTPATIENT
Start: 2023-11-16 | End: 2024-04-10 | Stop reason: SDUPTHER

## 2023-11-16 NOTE — TELEPHONE ENCOUNTER
Pt daughter called and states that a RX was sent in for her diabetic pill and it was for once a day but pt take sits twice.    Please advise.    - I do not see a recent med sent in?    Call [phone] Merna once sent in.

## 2023-11-17 DIAGNOSIS — R05.3 CHRONIC COUGH: ICD-10-CM

## 2023-11-17 DIAGNOSIS — S09.93XD FACIAL TRAUMA, SUBSEQUENT ENCOUNTER: ICD-10-CM

## 2023-11-20 RX ORDER — ACETAMINOPHEN 500 MG
1000 TABLET ORAL 3 TIMES DAILY
Qty: 90 TABLET | Refills: 0 | Status: SHIPPED | OUTPATIENT
Start: 2023-11-20 | End: 2023-12-18

## 2023-11-20 RX ORDER — ALBUTEROL SULFATE 90 UG/1
1-2 AEROSOL, METERED RESPIRATORY (INHALATION) EVERY 6 HOURS PRN
Qty: 18 G | Refills: 0 | Status: SHIPPED | OUTPATIENT
Start: 2023-11-20 | End: 2023-12-18

## 2023-11-20 RX ORDER — MUPIROCIN 20 MG/G
OINTMENT TOPICAL
Qty: 22 G | Refills: 0 | Status: SHIPPED | OUTPATIENT
Start: 2023-11-20 | End: 2023-12-18

## 2023-12-06 DIAGNOSIS — R05.3 CHRONIC COUGH: ICD-10-CM

## 2023-12-07 RX ORDER — CETIRIZINE HYDROCHLORIDE 10 MG/1
10 TABLET ORAL DAILY
Qty: 90 TABLET | Refills: 1 | Status: SHIPPED | OUTPATIENT
Start: 2023-12-07 | End: 2024-05-17

## 2023-12-15 DIAGNOSIS — M79.641 RIGHT HAND PAIN: ICD-10-CM

## 2023-12-15 DIAGNOSIS — R12 HEARTBURN: ICD-10-CM

## 2023-12-15 DIAGNOSIS — E78.5 HYPERLIPIDEMIA, MILD: ICD-10-CM

## 2023-12-15 DIAGNOSIS — S09.93XD FACIAL TRAUMA, SUBSEQUENT ENCOUNTER: ICD-10-CM

## 2023-12-15 DIAGNOSIS — R05.3 CHRONIC COUGH: ICD-10-CM

## 2023-12-15 DIAGNOSIS — E53.8 B12 DEFICIENCY: ICD-10-CM

## 2023-12-18 RX ORDER — CHOLECALCIFEROL (VITAMIN D3) 50 MCG
TABLET ORAL
Qty: 60 TABLET | Refills: 2 | Status: SHIPPED | OUTPATIENT
Start: 2023-12-18 | End: 2024-03-11

## 2023-12-18 RX ORDER — ROSUVASTATIN CALCIUM 20 MG/1
20 TABLET, COATED ORAL DAILY
Qty: 30 TABLET | Refills: 2 | Status: SHIPPED | OUTPATIENT
Start: 2023-12-18 | End: 2024-03-11

## 2023-12-18 RX ORDER — PANTOPRAZOLE SODIUM 40 MG/1
40 TABLET, DELAYED RELEASE ORAL 2 TIMES DAILY
Qty: 60 TABLET | Refills: 2 | Status: SHIPPED | OUTPATIENT
Start: 2023-12-18 | End: 2024-03-11

## 2023-12-18 RX ORDER — ALBUTEROL SULFATE 90 UG/1
1-2 AEROSOL, METERED RESPIRATORY (INHALATION) EVERY 6 HOURS PRN
Qty: 18 G | Refills: 0 | Status: SHIPPED | OUTPATIENT
Start: 2023-12-18 | End: 2024-01-15

## 2023-12-18 RX ORDER — MUPIROCIN 20 MG/G
OINTMENT TOPICAL
Qty: 22 G | Refills: 0 | Status: SHIPPED | OUTPATIENT
Start: 2023-12-18 | End: 2024-01-15

## 2023-12-18 RX ORDER — DICLOFENAC SODIUM 10 MG/G
GEL TOPICAL
Qty: 100 G | Refills: 0 | Status: SHIPPED | OUTPATIENT
Start: 2023-12-18 | End: 2024-01-15

## 2023-12-18 RX ORDER — ACETAMINOPHEN 500 MG
1000 TABLET ORAL 3 TIMES DAILY
Qty: 90 TABLET | Refills: 0 | Status: SHIPPED | OUTPATIENT
Start: 2023-12-18 | End: 2024-01-15

## 2024-01-12 DIAGNOSIS — M79.641 RIGHT HAND PAIN: ICD-10-CM

## 2024-01-12 DIAGNOSIS — E55.9 VITAMIN D DEFICIENCY: ICD-10-CM

## 2024-01-12 DIAGNOSIS — R05.3 CHRONIC COUGH: ICD-10-CM

## 2024-01-12 DIAGNOSIS — S09.93XD FACIAL TRAUMA, SUBSEQUENT ENCOUNTER: ICD-10-CM

## 2024-01-15 RX ORDER — MUPIROCIN 20 MG/G
OINTMENT TOPICAL
Qty: 22 G | Refills: 0 | Status: SHIPPED | OUTPATIENT
Start: 2024-01-15 | End: 2024-02-12

## 2024-01-15 RX ORDER — ALBUTEROL SULFATE 90 UG/1
1-2 AEROSOL, METERED RESPIRATORY (INHALATION) EVERY 6 HOURS PRN
Qty: 18 G | Refills: 0 | Status: SHIPPED | OUTPATIENT
Start: 2024-01-15 | End: 2024-02-12

## 2024-01-15 RX ORDER — ACETAMINOPHEN 500 MG
1000 TABLET ORAL 3 TIMES DAILY
Qty: 90 TABLET | Refills: 0 | Status: SHIPPED | OUTPATIENT
Start: 2024-01-15 | End: 2024-02-12

## 2024-01-15 RX ORDER — ERGOCALCIFEROL 1.25 MG/1
50000 CAPSULE ORAL
Qty: 12 CAPSULE | Refills: 0 | Status: SHIPPED | OUTPATIENT
Start: 2024-01-15 | End: 2024-04-10 | Stop reason: SDUPTHER

## 2024-01-15 RX ORDER — DICLOFENAC SODIUM 10 MG/G
GEL TOPICAL
Qty: 100 G | Refills: 0 | Status: SHIPPED | OUTPATIENT
Start: 2024-01-15 | End: 2024-02-12

## 2024-02-09 DIAGNOSIS — M79.641 RIGHT HAND PAIN: ICD-10-CM

## 2024-02-09 DIAGNOSIS — R05.3 CHRONIC COUGH: ICD-10-CM

## 2024-02-09 DIAGNOSIS — S09.93XD FACIAL TRAUMA, SUBSEQUENT ENCOUNTER: ICD-10-CM

## 2024-02-12 RX ORDER — ACETAMINOPHEN 500 MG
1000 TABLET ORAL 3 TIMES DAILY
Qty: 90 TABLET | Refills: 0 | Status: SHIPPED | OUTPATIENT
Start: 2024-02-12 | End: 2024-03-11

## 2024-02-12 RX ORDER — DICLOFENAC SODIUM 10 MG/G
GEL TOPICAL
Qty: 100 G | Refills: 0 | Status: SHIPPED | OUTPATIENT
Start: 2024-02-12 | End: 2024-02-20 | Stop reason: ALTCHOICE

## 2024-02-12 RX ORDER — ALBUTEROL SULFATE 90 UG/1
1-2 AEROSOL, METERED RESPIRATORY (INHALATION) EVERY 6 HOURS PRN
Qty: 18 G | Refills: 0 | Status: SHIPPED | OUTPATIENT
Start: 2024-02-12 | End: 2024-03-11

## 2024-02-12 RX ORDER — MUPIROCIN 20 MG/G
OINTMENT TOPICAL
Qty: 22 G | Refills: 0 | Status: SHIPPED | OUTPATIENT
Start: 2024-02-12 | End: 2024-02-20 | Stop reason: ALTCHOICE

## 2024-02-20 ENCOUNTER — LAB (OUTPATIENT)
Dept: LAB | Facility: LAB | Age: 74
End: 2024-02-20
Payer: MEDICAID

## 2024-02-20 ENCOUNTER — OFFICE VISIT (OUTPATIENT)
Dept: PRIMARY CARE | Facility: CLINIC | Age: 74
End: 2024-02-20
Payer: MEDICAID

## 2024-02-20 VITALS
BODY MASS INDEX: 28.22 KG/M2 | OXYGEN SATURATION: 97 % | SYSTOLIC BLOOD PRESSURE: 112 MMHG | RESPIRATION RATE: 16 BRPM | HEART RATE: 89 BPM | WEIGHT: 164.4 LBS | TEMPERATURE: 97.5 F | DIASTOLIC BLOOD PRESSURE: 82 MMHG

## 2024-02-20 DIAGNOSIS — Z12.31 ENCOUNTER FOR SCREENING MAMMOGRAM FOR MALIGNANT NEOPLASM OF BREAST: ICD-10-CM

## 2024-02-20 DIAGNOSIS — E53.8 B12 DEFICIENCY: ICD-10-CM

## 2024-02-20 DIAGNOSIS — R12 HEARTBURN: Primary | ICD-10-CM

## 2024-02-20 DIAGNOSIS — E11.65 TYPE 2 DIABETES MELLITUS WITH HYPERGLYCEMIA, WITHOUT LONG-TERM CURRENT USE OF INSULIN (MULTI): ICD-10-CM

## 2024-02-20 DIAGNOSIS — E55.9 VITAMIN D DEFICIENCY: ICD-10-CM

## 2024-02-20 DIAGNOSIS — I10 BENIGN ESSENTIAL HYPERTENSION: ICD-10-CM

## 2024-02-20 PROBLEM — R30.0 DYSURIA: Status: RESOLVED | Noted: 2023-03-06 | Resolved: 2024-02-20

## 2024-02-20 PROBLEM — R10.31 ACUTE RIGHT LOWER QUADRANT PAIN: Status: RESOLVED | Noted: 2023-10-20 | Resolved: 2024-02-20

## 2024-02-20 PROBLEM — E16.2 HYPOGLYCEMIA: Status: RESOLVED | Noted: 2023-03-06 | Resolved: 2024-02-20

## 2024-02-20 PROBLEM — R60.0 LOWER LEG EDEMA: Status: RESOLVED | Noted: 2023-03-06 | Resolved: 2024-02-20

## 2024-02-20 PROBLEM — H53.9 VISUAL CHANGES: Status: RESOLVED | Noted: 2023-03-06 | Resolved: 2024-02-20

## 2024-02-20 PROBLEM — B37.9 CANDIDIASIS: Status: RESOLVED | Noted: 2023-03-06 | Resolved: 2024-02-20

## 2024-02-20 PROBLEM — M25.612 STIFFNESS OF LEFT SHOULDER, NOT ELSEWHERE CLASSIFIED: Status: RESOLVED | Noted: 2023-03-06 | Resolved: 2024-02-20

## 2024-02-20 PROBLEM — R05.9 COUGH: Status: RESOLVED | Noted: 2023-03-06 | Resolved: 2024-02-20

## 2024-02-20 PROBLEM — M25.512 SHOULDER PAIN, LEFT: Status: RESOLVED | Noted: 2023-03-06 | Resolved: 2024-02-20

## 2024-02-20 PROBLEM — R21 RASH AND NONSPECIFIC SKIN ERUPTION: Status: RESOLVED | Noted: 2023-03-06 | Resolved: 2024-02-20

## 2024-02-20 PROBLEM — N39.0 ACUTE UTI: Status: RESOLVED | Noted: 2023-10-20 | Resolved: 2024-02-20

## 2024-02-20 PROBLEM — T14.8XXA MUSCLE STRAIN: Status: RESOLVED | Noted: 2023-03-06 | Resolved: 2024-02-20

## 2024-02-20 PROBLEM — R53.83 FATIGUE: Status: RESOLVED | Noted: 2019-08-01 | Resolved: 2024-02-20

## 2024-02-20 PROBLEM — R39.15 URINARY URGENCY: Status: RESOLVED | Noted: 2023-03-06 | Resolved: 2024-02-20

## 2024-02-20 PROBLEM — S39.012A STRAIN OF MUSCLE, FASCIA AND TENDON OF LOWER BACK, INITIAL ENCOUNTER: Status: RESOLVED | Noted: 2024-02-02 | Resolved: 2024-02-20

## 2024-02-20 PROBLEM — M47.816 LUMBAR SPONDYLOSIS: Status: ACTIVE | Noted: 2024-02-02

## 2024-02-20 PROBLEM — N23 KIDNEY PAIN: Status: RESOLVED | Noted: 2023-03-06 | Resolved: 2024-02-20

## 2024-02-20 PROBLEM — K57.32 DIVERTICULITIS, COLON: Status: RESOLVED | Noted: 2023-03-06 | Resolved: 2024-02-20

## 2024-02-20 PROBLEM — R11.2 NAUSEA WITH VOMITING: Status: RESOLVED | Noted: 2023-03-06 | Resolved: 2024-02-20

## 2024-02-20 PROBLEM — R10.9 ABDOMINAL PAIN: Status: RESOLVED | Noted: 2023-03-06 | Resolved: 2024-02-20

## 2024-02-20 PROBLEM — L29.9 ITCHY SKIN: Status: RESOLVED | Noted: 2023-03-06 | Resolved: 2024-02-20

## 2024-02-20 PROBLEM — R10.13 DYSPEPSIA: Status: RESOLVED | Noted: 2017-07-05 | Resolved: 2024-02-20

## 2024-02-20 PROBLEM — H57.9 EYE PROBLEMS: Status: RESOLVED | Noted: 2023-03-06 | Resolved: 2024-02-20

## 2024-02-20 PROBLEM — S39.012A STRAIN OF MUSCLE, FASCIA AND TENDON OF LOWER BACK, INITIAL ENCOUNTER: Status: ACTIVE | Noted: 2024-02-02
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
## 2024-02-20 PROBLEM — M75.102 ROTATOR CUFF TEAR, LEFT: Status: RESOLVED | Noted: 2023-03-06 | Resolved: 2024-02-20

## 2024-02-20 PROBLEM — S49.90XA SHOULDER INJURY: Status: RESOLVED | Noted: 2023-03-06 | Resolved: 2024-02-20

## 2024-02-20 PROBLEM — R42 DIZZINESS: Status: RESOLVED | Noted: 2017-02-25 | Resolved: 2024-02-20

## 2024-02-20 PROBLEM — R26.2 DIFFICULTY WALKING: Status: RESOLVED | Noted: 2023-03-06 | Resolved: 2024-02-20

## 2024-02-20 PROBLEM — R92.8 ABNORMAL MAMMOGRAM: Status: RESOLVED | Noted: 2023-03-06 | Resolved: 2024-02-20

## 2024-02-20 LAB
25(OH)D3 SERPL-MCNC: 36 NG/ML (ref 30–100)
POC HEMOGLOBIN A1C: 6.7 % (ref 4.2–6.5)
VIT B12 SERPL-MCNC: 3250 PG/ML (ref 211–911)

## 2024-02-20 PROCEDURE — 1159F MED LIST DOCD IN RCRD: CPT | Performed by: INTERNAL MEDICINE

## 2024-02-20 PROCEDURE — 99214 OFFICE O/P EST MOD 30 MIN: CPT | Performed by: INTERNAL MEDICINE

## 2024-02-20 PROCEDURE — 82306 VITAMIN D 25 HYDROXY: CPT

## 2024-02-20 PROCEDURE — 1036F TOBACCO NON-USER: CPT | Performed by: INTERNAL MEDICINE

## 2024-02-20 PROCEDURE — 3074F SYST BP LT 130 MM HG: CPT | Performed by: INTERNAL MEDICINE

## 2024-02-20 PROCEDURE — 3079F DIAST BP 80-89 MM HG: CPT | Performed by: INTERNAL MEDICINE

## 2024-02-20 PROCEDURE — 83036 HEMOGLOBIN GLYCOSYLATED A1C: CPT | Performed by: INTERNAL MEDICINE

## 2024-02-20 PROCEDURE — 36415 COLL VENOUS BLD VENIPUNCTURE: CPT

## 2024-02-20 PROCEDURE — 82607 VITAMIN B-12: CPT

## 2024-02-20 PROCEDURE — 1160F RVW MEDS BY RX/DR IN RCRD: CPT | Performed by: INTERNAL MEDICINE

## 2024-02-20 PROCEDURE — 1125F AMNT PAIN NOTED PAIN PRSNT: CPT | Performed by: INTERNAL MEDICINE

## 2024-02-20 PROCEDURE — 4010F ACE/ARB THERAPY RXD/TAKEN: CPT | Performed by: INTERNAL MEDICINE

## 2024-02-20 RX ORDER — SUCRALFATE 1 G/1
1 TABLET ORAL
Qty: 120 TABLET | Refills: 1 | Status: SHIPPED | OUTPATIENT
Start: 2024-02-20 | End: 2024-03-15

## 2024-02-20 ASSESSMENT — ENCOUNTER SYMPTOMS
CONSTIPATION: 0
DIARRHEA: 0
ABDOMINAL PAIN: 1
EYE PAIN: 0
BLOOD IN STOOL: 0
RHINORRHEA: 0
VOMITING: 0
SORE THROAT: 0
CHEST TIGHTNESS: 0
DYSPHORIC MOOD: 0
POLYPHAGIA: 0
FEVER: 0
NUMBNESS: 1
NERVOUS/ANXIOUS: 0
DYSURIA: 0
HEADACHES: 0
NAUSEA: 1
CHILLS: 0
DIZZINESS: 0
FREQUENCY: 0
UNEXPECTED WEIGHT CHANGE: 0
WHEEZING: 0
WOUND: 0
MYALGIAS: 0
POLYDIPSIA: 0
PALPITATIONS: 0
SHORTNESS OF BREATH: 0
COUGH: 0
ARTHRALGIAS: 1
HEMATURIA: 0

## 2024-02-20 ASSESSMENT — PATIENT HEALTH QUESTIONNAIRE - PHQ9
2. FEELING DOWN, DEPRESSED OR HOPELESS: NOT AT ALL
1. LITTLE INTEREST OR PLEASURE IN DOING THINGS: NOT AT ALL
SUM OF ALL RESPONSES TO PHQ9 QUESTIONS 1 AND 2: 0

## 2024-02-20 NOTE — PROGRESS NOTES
Subjective   Patient ID: Najibeh Tannous is a 73 y.o. female who presents for Follow-up (4 month and A1C check).    HPI     Here for followup   passed away  She is doing ok  Here with daughter  Sugars 80-110s  She states BP doing well  Having belching and epigastric pain. States feels like ulcer. Taking her pantoprazole. Some nausea. No blood in stools. No changes to bowels  Taking b12 and vitamin D. Has helped her numbness  Slipped on ice and had a fall. States saw ortho and given pain meds and muscle relaxer and helped      Review of Systems   Constitutional:  Negative for chills, fever and unexpected weight change.   HENT:  Negative for congestion, hearing loss, rhinorrhea and sore throat.    Eyes:  Negative for pain and visual disturbance.   Respiratory:  Negative for cough, chest tightness, shortness of breath and wheezing.    Cardiovascular:  Negative for chest pain and palpitations.   Gastrointestinal:  Positive for abdominal pain and nausea. Negative for blood in stool, constipation, diarrhea and vomiting.   Endocrine: Negative for cold intolerance, heat intolerance, polydipsia and polyphagia.   Genitourinary:  Negative for dysuria, frequency and hematuria.   Musculoskeletal:  Positive for arthralgias. Negative for myalgias.   Skin:  Negative for rash and wound.   Neurological:  Positive for numbness. Negative for dizziness, syncope and headaches.   Psychiatric/Behavioral:  Negative for dysphoric mood. The patient is not nervous/anxious.        Objective   /82   Pulse 89   Temp 36.4 °C (97.5 °F)   Resp 16   Wt 74.6 kg (164 lb 6.4 oz)   SpO2 97%   BMI 28.22 kg/m²     Physical Exam  Constitutional:       Appearance: Normal appearance.   Cardiovascular:      Rate and Rhythm: Normal rate and regular rhythm.      Heart sounds: Normal heart sounds. No murmur heard.     No gallop.   Pulmonary:      Effort: Pulmonary effort is normal. No respiratory distress.      Breath sounds: Normal breath  sounds.   Abdominal:      General: There is no distension.      Palpations: Abdomen is soft. There is no mass.      Tenderness: There is abdominal tenderness (epigastric).   Musculoskeletal:      Right lower leg: No edema.      Left lower leg: No edema.   Neurological:      Mental Status: She is alert.         Assessment/Plan   Problem List Items Addressed This Visit             ICD-10-CM    Benign essential hypertension I10    Heartburn - Primary R12    Relevant Medications    sucralfate (Carafate) 1 gram tablet    Other Relevant Orders    Referral to Gastroenterology    Type 2 diabetes mellitus with hyperglycemia, without long-term current use of insulin (CMS/[facility]) E11.65    Relevant Orders    POCT glycosylated hemoglobin (Hb A1C) manually resulted (Completed)     Other Visit Diagnoses         Codes    B12 deficiency     E53.8    Relevant Orders    Vitamin B12    Vitamin D deficiency     E55.9    Relevant Orders    Vitamin D 25-Hydroxy,Total (for eval of Vitamin D levels)    Encounter for screening mammogram for malignant neoplasm of breast     Z12.31    Relevant Orders    BI mammo bilateral screening tomosynthesis             Neuropathy  -improving with b12    B12 deficiency-recheck    Vitamin D deficiency-cont vitamin D      Hx of spinal stenosis s/p L4/L5 laminectomy (8/19)  MRI spine 1/31/20: postsurgical changes, re-demonstrated lumbar spondylosis and grade 1 L4/L5 anterolitsthesis with moderate narrowing.     Hypertension: controlled on Norvasc     ELbow tendonitis: stop knitting, compression sleeve     Diabetes type 2: a1c=5.8--> 6.3--> 6.5--> 6.0--> 6.3--> 6.7  <130 as she is having low sugars  -actos with edema  -januvia caused stomach issues  -on glyburide  -does not want metformin  -a1c 3 months    Epigastric pain  -add carafate  -cont PPI  - -discussed foods to avoid like citric fruits (oranges, grapefruit, phillip, and lime), chocolate, coffee, spicy foods, tomatoes and tomato products, alcohol, etc.  Avoid eating late at night and elevate the head of the bed.  -send to GI for EGD        Hyperlipidemia: has had issues with statins  -controlled on crestor      Right thumb trapeziectomy 6/2020; seeing ortho  -voltaren gel as needed--did not work  -advised to see ortho  -cannot do po NSAIDs with allergy  -saw ortho and told to do PT only (not not finished)  -limited with meds and cannot do NSAIDs. Tramadol does not help  -states now fine     S/p rotator cuff repair: hurts with fall-advised to go to Dr. Harris     GERD: controlled on PPI     Itchy skin: send to derm, trial steroid     Eye issues: on eye drops per eye doc     f/u 3-4months with a1c.     Health Maintenance  -Pap smear: >65  -Vaccinations: Pneumovax UTD, Declines shingrix. ADvised tdap. Flu UTD  -Mammogram: 7/21- normal, ordered  -Colonoscopy: 7/5/17--repeat 10 years ([place] Dr. Dominguez--got records)  -DEXA: 8/20-osteopenia, repeat 2 years (8/22)  Patient was identified as a fall risk. Risk prevention instructions provided.

## 2024-02-22 ENCOUNTER — TELEPHONE (OUTPATIENT)
Dept: PRIMARY CARE | Facility: CLINIC | Age: 74
End: 2024-02-22
Payer: MEDICAID

## 2024-02-22 NOTE — TELEPHONE ENCOUNTER
----- Message from Becca Faith MD sent at 2/21/2024 10:53 AM EST -----  Will you let them know vitamin D and b12 have returned to normal. She can do the b12 every other day now and she should continue the vitamin d

## 2024-02-29 ENCOUNTER — TELEPHONE (OUTPATIENT)
Dept: PRIMARY CARE | Facility: CLINIC | Age: 74
End: 2024-02-29
Payer: MEDICAID

## 2024-02-29 NOTE — TELEPHONE ENCOUNTER
Pts daughter called and states that the new stomach medication is causing really bad headaches, like migraines.    Please advise.

## 2024-03-09 DIAGNOSIS — R05.3 CHRONIC COUGH: ICD-10-CM

## 2024-03-09 DIAGNOSIS — E78.5 HYPERLIPIDEMIA, MILD: ICD-10-CM

## 2024-03-09 DIAGNOSIS — R12 HEARTBURN: ICD-10-CM

## 2024-03-09 DIAGNOSIS — E53.8 B12 DEFICIENCY: ICD-10-CM

## 2024-03-09 DIAGNOSIS — S09.93XD FACIAL TRAUMA, SUBSEQUENT ENCOUNTER: ICD-10-CM

## 2024-03-11 RX ORDER — ROSUVASTATIN CALCIUM 20 MG/1
20 TABLET, COATED ORAL DAILY
Qty: 30 TABLET | Refills: 2 | Status: SHIPPED | OUTPATIENT
Start: 2024-03-11 | End: 2024-06-05

## 2024-03-11 RX ORDER — ALBUTEROL SULFATE 90 UG/1
1-2 AEROSOL, METERED RESPIRATORY (INHALATION) EVERY 6 HOURS PRN
Qty: 18 G | Refills: 0 | Status: SHIPPED | OUTPATIENT
Start: 2024-03-11 | End: 2024-04-10 | Stop reason: SDUPTHER

## 2024-03-11 RX ORDER — PANTOPRAZOLE SODIUM 40 MG/1
40 TABLET, DELAYED RELEASE ORAL 2 TIMES DAILY
Qty: 60 TABLET | Refills: 2 | Status: SHIPPED | OUTPATIENT
Start: 2024-03-11 | End: 2024-06-09

## 2024-03-11 RX ORDER — ACETAMINOPHEN 500 MG
1000 TABLET ORAL 3 TIMES DAILY
Qty: 90 TABLET | Refills: 0 | Status: SHIPPED | OUTPATIENT
Start: 2024-03-11 | End: 2024-04-10 | Stop reason: SDUPTHER

## 2024-03-11 RX ORDER — VITAMIN B COMPLEX
2 TABLET ORAL DAILY
Qty: 60 TABLET | Refills: 2 | Status: SHIPPED | OUTPATIENT
Start: 2024-03-11 | End: 2024-06-05

## 2024-03-14 DIAGNOSIS — R12 HEARTBURN: ICD-10-CM

## 2024-03-15 RX ORDER — SUCRALFATE 1 G/1
1 TABLET ORAL
Qty: 120 TABLET | Refills: 1 | Status: SHIPPED | OUTPATIENT
Start: 2024-03-15 | End: 2024-05-17

## 2024-03-27 ENCOUNTER — OFFICE VISIT (OUTPATIENT)
Dept: PRIMARY CARE | Facility: CLINIC | Age: 74
End: 2024-03-27
Payer: MEDICAID

## 2024-03-27 VITALS
OXYGEN SATURATION: 97 % | HEART RATE: 61 BPM | WEIGHT: 168.8 LBS | SYSTOLIC BLOOD PRESSURE: 130 MMHG | DIASTOLIC BLOOD PRESSURE: 60 MMHG | BODY MASS INDEX: 28.82 KG/M2 | HEIGHT: 64 IN

## 2024-03-27 DIAGNOSIS — L30.9 DERMATITIS: Primary | ICD-10-CM

## 2024-03-27 PROCEDURE — 99213 OFFICE O/P EST LOW 20 MIN: CPT | Performed by: NURSE PRACTITIONER

## 2024-03-27 PROCEDURE — 3075F SYST BP GE 130 - 139MM HG: CPT | Performed by: NURSE PRACTITIONER

## 2024-03-27 PROCEDURE — 3078F DIAST BP <80 MM HG: CPT | Performed by: NURSE PRACTITIONER

## 2024-03-27 PROCEDURE — 1160F RVW MEDS BY RX/DR IN RCRD: CPT | Performed by: NURSE PRACTITIONER

## 2024-03-27 PROCEDURE — 1159F MED LIST DOCD IN RCRD: CPT | Performed by: NURSE PRACTITIONER

## 2024-03-27 PROCEDURE — 4010F ACE/ARB THERAPY RXD/TAKEN: CPT | Performed by: NURSE PRACTITIONER

## 2024-03-27 RX ORDER — TRIAMCINOLONE ACETONIDE 1 MG/G
CREAM TOPICAL 2 TIMES DAILY
Qty: 15 G | Refills: 0 | Status: SHIPPED | OUTPATIENT
Start: 2024-03-27

## 2024-03-27 RX ORDER — MINERAL OIL
180 ENEMA (ML) RECTAL DAILY
Qty: 60 TABLET | Refills: 0 | Status: SHIPPED | OUTPATIENT
Start: 2024-03-27 | End: 2024-05-26

## 2024-03-27 NOTE — PROGRESS NOTES
"Subjective   Patient ID: Najibeh Tannous is a 73 y.o. female who presents for Rash (Onset about 1 week ago. Patient states that rash is itchy and painful. Rash is located from neck up. ).    Patient of Dr. Faith.    Presents today with 1 month history of skin rash to bilateral underside of neck and scalp.  She states it goes up the back of her neck and into her scalp on both sides. She states it started after taking 2 pills of her Carafate. She stopped after this but has not resolved. States yesterday the skin was very red. Today has calmed down but skin looks dry.  Endorses associated itching.          Review of Systems  otherwise negative aside from what was mentioned above in HPI.    Objective   /60 (BP Location: Left arm, Patient Position: Sitting)   Pulse 61   Ht 1.626 m (5' 4\")   Wt 76.6 kg (168 lb 12.8 oz)   SpO2 97%   BMI 28.97 kg/m²     Physical Exam  Constitutional:       Appearance: Normal appearance.   Cardiovascular:      Rate and Rhythm: Normal rate and regular rhythm.   Pulmonary:      Effort: Pulmonary effort is normal.   Abdominal:      General: Abdomen is flat.   Skin:     Comments: Dryness to neck; no rash noted on exam today. No signs of shingles or vesicular rash.   Neurological:      General: No focal deficit present.      Mental Status: She is alert and oriented to person, place, and time. Mental status is at baseline.   Psychiatric:         Mood and Affect: Mood normal.         Behavior: Behavior normal.         Thought Content: Thought content normal.         Judgment: Judgment normal.         Assessment/Plan   Problem List Items Addressed This Visit    None  Visit Diagnoses         Codes    Dermatitis    -  Primary L30.9    Relevant Medications    fexofenadine (Allegra) 180 mg tablet    triamcinolone (Kenalog) 0.1 % cream    Other Relevant Orders    Referral to Allergy        Advised important to keep skin hydrated. Advised to ensure no changes in detergents, topical skin care " products.   Use mild fragrance free moisturizer.  Use allegra for 2 months. Kenalog cream 2 x daily.  If not improving or worsening during this time will get an appointment with allergy for further testing.

## 2024-04-01 ENCOUNTER — TELEPHONE (OUTPATIENT)
Dept: PRIMARY CARE | Facility: CLINIC | Age: 74
End: 2024-04-01
Payer: MEDICAID

## 2024-04-01 NOTE — TELEPHONE ENCOUNTER
Pt's daughter called stating pt has complaining of inconsistent BP really high and really low.   She didn't provide me with any current reading.    Please call [phone] to advise.

## 2024-04-03 ENCOUNTER — OFFICE VISIT (OUTPATIENT)
Dept: PRIMARY CARE | Facility: CLINIC | Age: 74
End: 2024-04-03
Payer: MEDICAID

## 2024-04-03 VITALS
HEIGHT: 64 IN | HEART RATE: 69 BPM | WEIGHT: 168 LBS | SYSTOLIC BLOOD PRESSURE: 140 MMHG | BODY MASS INDEX: 28.68 KG/M2 | DIASTOLIC BLOOD PRESSURE: 50 MMHG | OXYGEN SATURATION: 98 %

## 2024-04-03 DIAGNOSIS — I10 BENIGN ESSENTIAL HTN: ICD-10-CM

## 2024-04-03 DIAGNOSIS — E11.65 TYPE 2 DIABETES MELLITUS WITH HYPERGLYCEMIA, WITHOUT LONG-TERM CURRENT USE OF INSULIN (MULTI): Chronic | ICD-10-CM

## 2024-04-03 DIAGNOSIS — E78.5 HYPERLIPIDEMIA, MILD: Chronic | ICD-10-CM

## 2024-04-03 DIAGNOSIS — M47.816 LUMBAR SPONDYLOSIS: Chronic | ICD-10-CM

## 2024-04-03 DIAGNOSIS — M54.41 ACUTE RIGHT-SIDED LOW BACK PAIN WITH RIGHT-SIDED SCIATICA: Primary | ICD-10-CM

## 2024-04-03 DIAGNOSIS — I10 BENIGN ESSENTIAL HYPERTENSION: ICD-10-CM

## 2024-04-03 PROCEDURE — 1160F RVW MEDS BY RX/DR IN RCRD: CPT | Performed by: NURSE PRACTITIONER

## 2024-04-03 PROCEDURE — 1159F MED LIST DOCD IN RCRD: CPT | Performed by: NURSE PRACTITIONER

## 2024-04-03 PROCEDURE — 99214 OFFICE O/P EST MOD 30 MIN: CPT | Performed by: NURSE PRACTITIONER

## 2024-04-03 PROCEDURE — 93000 ELECTROCARDIOGRAM COMPLETE: CPT | Performed by: NURSE PRACTITIONER

## 2024-04-03 PROCEDURE — 3078F DIAST BP <80 MM HG: CPT | Performed by: NURSE PRACTITIONER

## 2024-04-03 PROCEDURE — 3077F SYST BP >= 140 MM HG: CPT | Performed by: NURSE PRACTITIONER

## 2024-04-03 RX ORDER — METHYLPREDNISOLONE 4 MG/1
TABLET ORAL
Qty: 21 TABLET | Refills: 0 | Status: SHIPPED | OUTPATIENT
Start: 2024-04-03 | End: 2024-04-10

## 2024-04-03 RX ORDER — AMLODIPINE BESYLATE 5 MG/1
5 TABLET ORAL DAILY
Qty: 90 TABLET | Refills: 0 | Status: SHIPPED | OUTPATIENT
Start: 2024-04-03 | End: 2024-09-30

## 2024-04-03 ASSESSMENT — PATIENT HEALTH QUESTIONNAIRE - PHQ9
1. LITTLE INTEREST OR PLEASURE IN DOING THINGS: NOT AT ALL
SUM OF ALL RESPONSES TO PHQ9 QUESTIONS 1 AND 2: 0
2. FEELING DOWN, DEPRESSED OR HOPELESS: NOT AT ALL

## 2024-04-03 NOTE — ASSESSMENT & PLAN NOTE
Check blood glucose daily at home and bring log with you to next visit. Ensure not getting low BG causing dizziness.

## 2024-04-03 NOTE — PROGRESS NOTES
"Subjective   Patient ID: Najibeh Tannous is a 73 y.o. female who presents for blood pressure issues (Patient states she has been checking her blood pressure at [facility] and sometimes it its elevated and some times it is low. ).    BP has been fluctuating.    190/100 last week per family member. But then patient said that was after colonoscopy and it has actually been low.  States top number has been ok 140 but bottom number has been 40s.   Drinking anough water all day.   5-6 cups of water daily.  Describes dizziness as constant and not positional. Feels off balance.  Better in the morning and worse at the end of the day.   Checking blood pressure once daily. Later during the day.   Pulse has been a little lower too used to be in the 80s but now in the 60s.     Pain is located in mid back and goes down the leg.        Review of Systems  otherwise negative aside from what was mentioned above in HPI.    Objective   /50 (BP Location: Left arm, Patient Position: Sitting)   Pulse 69   Ht 1.626 m (5' 4\")   Wt 76.2 kg (168 lb)   SpO2 98%   BMI 28.84 kg/m²     Physical Exam  Constitutional:       Appearance: Normal appearance.   Cardiovascular:      Rate and Rhythm: Normal rate and regular rhythm.   Pulmonary:      Breath sounds: Normal breath sounds.   Abdominal:      General: Abdomen is flat.   Neurological:      General: No focal deficit present.      Mental Status: She is alert and oriented to person, place, and time. Mental status is at baseline.   Psychiatric:         Mood and Affect: Mood normal.         Behavior: Behavior normal.         Thought Content: Thought content normal.         Judgment: Judgment normal.         Assessment/Plan   Problem List Items Addressed This Visit             ICD-10-CM    Benign essential hypertension (Chronic) I10     Stop Lisinopril  Take only Norvasc (amlodipine)    See if dizziness improves on one BP medication.  Keep an eye on BP daily and call PCP sooner if BP is going " up.         Relevant Orders    ECG 12 lead (Clinic Performed)    Hyperlipidemia, mild (Chronic) E78.5     Get throat itching sometimes after she takes her crestor.   No lip swelling or difficulty swallowing or breathing.  Make note if actually happening every time you take this and if so discuss with PCP at next visit about changing medications.         Type 2 diabetes mellitus with hyperglycemia, without long-term current use of insulin (CMS/[facility]) (Chronic) E11.65     Check blood glucose daily at home and bring log with you to next visit. Ensure not getting low BG causing dizziness.          Lumbar spondylosis (Chronic) M47.816     Refer to pain management  Medrol pack ordered          Other Visit Diagnoses         Codes    Acute right-sided low back pain with right-sided sciatica    -  Primary M54.41    Relevant Medications    methylPREDNISolone (Medrol Dospak) 4 mg tablets    Other Relevant Orders    Referral to Pain Medicine    Benign essential HTN     I10    Relevant Medications    amLODIPine (Norvasc) 5 mg tablet

## 2024-04-03 NOTE — ASSESSMENT & PLAN NOTE
Stop Lisinopril  Take only Norvasc (amlodipine)    See if dizziness improves on one BP medication.  Keep an eye on BP daily and call PCP sooner if BP is going up.

## 2024-04-03 NOTE — ASSESSMENT & PLAN NOTE
Get throat itching sometimes after she takes her crestor.   No lip swelling or difficulty swallowing or breathing.  Make note if actually happening every time you take this and if so discuss with PCP at next visit about changing medications.

## 2024-04-10 DIAGNOSIS — S09.93XD FACIAL TRAUMA, SUBSEQUENT ENCOUNTER: ICD-10-CM

## 2024-04-10 DIAGNOSIS — E55.9 VITAMIN D DEFICIENCY: ICD-10-CM

## 2024-04-10 DIAGNOSIS — E11.9 TYPE 2 DIABETES MELLITUS WITHOUT COMPLICATION, UNSPECIFIED WHETHER LONG TERM INSULIN USE (MULTI): ICD-10-CM

## 2024-04-10 DIAGNOSIS — R05.3 CHRONIC COUGH: ICD-10-CM

## 2024-04-10 RX ORDER — GLYBURIDE 2.5 MG/1
5 TABLET ORAL DAILY
Qty: 60 TABLET | Refills: 1 | Status: SHIPPED | OUTPATIENT
Start: 2024-04-10 | End: 2024-06-05

## 2024-04-10 RX ORDER — ERGOCALCIFEROL 1.25 MG/1
50000 CAPSULE ORAL
Qty: 12 CAPSULE | Refills: 0 | Status: SHIPPED | OUTPATIENT
Start: 2024-04-14 | End: 2024-07-07

## 2024-04-10 RX ORDER — ACETAMINOPHEN 500 MG
1000 TABLET ORAL 3 TIMES DAILY
Qty: 90 TABLET | Refills: 0 | Status: SHIPPED | OUTPATIENT
Start: 2024-04-10 | End: 2024-05-17

## 2024-04-10 RX ORDER — ALBUTEROL SULFATE 90 UG/1
1-2 AEROSOL, METERED RESPIRATORY (INHALATION) EVERY 6 HOURS PRN
Qty: 18 G | Refills: 0 | Status: SHIPPED | OUTPATIENT
Start: 2024-04-10 | End: 2024-05-17

## 2024-05-17 DIAGNOSIS — R12 HEARTBURN: ICD-10-CM

## 2024-05-17 DIAGNOSIS — S09.93XD FACIAL TRAUMA, SUBSEQUENT ENCOUNTER: ICD-10-CM

## 2024-05-17 DIAGNOSIS — R05.3 CHRONIC COUGH: ICD-10-CM

## 2024-05-17 RX ORDER — ACETAMINOPHEN 500 MG
1000 TABLET ORAL 3 TIMES DAILY
Qty: 90 TABLET | Refills: 0 | Status: SHIPPED | OUTPATIENT
Start: 2024-05-17 | End: 2024-06-05

## 2024-05-17 RX ORDER — ALBUTEROL SULFATE 90 UG/1
1-2 AEROSOL, METERED RESPIRATORY (INHALATION) EVERY 6 HOURS PRN
Qty: 18 G | Refills: 0 | Status: SHIPPED | OUTPATIENT
Start: 2024-05-17 | End: 2024-06-05

## 2024-05-17 RX ORDER — CETIRIZINE HYDROCHLORIDE 10 MG/1
10 TABLET ORAL DAILY
Qty: 90 TABLET | Refills: 1 | Status: SHIPPED | OUTPATIENT
Start: 2024-05-17 | End: 2024-11-13

## 2024-05-17 RX ORDER — SUCRALFATE 1 G/1
1 TABLET ORAL
Qty: 120 TABLET | Refills: 1 | Status: SHIPPED | OUTPATIENT
Start: 2024-05-17 | End: 2025-05-17

## 2024-05-21 ENCOUNTER — OFFICE VISIT (OUTPATIENT)
Dept: PRIMARY CARE | Facility: CLINIC | Age: 74
End: 2024-05-21
Payer: MEDICAID

## 2024-05-21 VITALS
RESPIRATION RATE: 16 BRPM | SYSTOLIC BLOOD PRESSURE: 148 MMHG | OXYGEN SATURATION: 96 % | WEIGHT: 170 LBS | HEART RATE: 66 BPM | HEIGHT: 64 IN | BODY MASS INDEX: 29.02 KG/M2 | DIASTOLIC BLOOD PRESSURE: 75 MMHG | TEMPERATURE: 98.5 F

## 2024-05-21 DIAGNOSIS — Z78.0 MENOPAUSE: ICD-10-CM

## 2024-05-21 DIAGNOSIS — E55.9 VITAMIN D DEFICIENCY: ICD-10-CM

## 2024-05-21 DIAGNOSIS — B37.9 YEAST INFECTION: Primary | ICD-10-CM

## 2024-05-21 DIAGNOSIS — I10 BENIGN ESSENTIAL HYPERTENSION: Chronic | ICD-10-CM

## 2024-05-21 DIAGNOSIS — E11.65 TYPE 2 DIABETES MELLITUS WITH HYPERGLYCEMIA, WITHOUT LONG-TERM CURRENT USE OF INSULIN (MULTI): Chronic | ICD-10-CM

## 2024-05-21 DIAGNOSIS — G62.9 NEUROPATHY: ICD-10-CM

## 2024-05-21 LAB — POC HEMOGLOBIN A1C: 6.5 % (ref 4.2–6.5)

## 2024-05-21 PROCEDURE — 1036F TOBACCO NON-USER: CPT | Performed by: INTERNAL MEDICINE

## 2024-05-21 PROCEDURE — 99214 OFFICE O/P EST MOD 30 MIN: CPT | Performed by: INTERNAL MEDICINE

## 2024-05-21 PROCEDURE — 1160F RVW MEDS BY RX/DR IN RCRD: CPT | Performed by: INTERNAL MEDICINE

## 2024-05-21 PROCEDURE — 3078F DIAST BP <80 MM HG: CPT | Performed by: INTERNAL MEDICINE

## 2024-05-21 PROCEDURE — 3077F SYST BP >= 140 MM HG: CPT | Performed by: INTERNAL MEDICINE

## 2024-05-21 PROCEDURE — 83036 HEMOGLOBIN GLYCOSYLATED A1C: CPT | Performed by: INTERNAL MEDICINE

## 2024-05-21 PROCEDURE — 1159F MED LIST DOCD IN RCRD: CPT | Performed by: INTERNAL MEDICINE

## 2024-05-21 RX ORDER — FLUCONAZOLE 150 MG/1
150 TABLET ORAL ONCE
Qty: 1 TABLET | Refills: 0 | Status: SHIPPED | OUTPATIENT
Start: 2024-05-21 | End: 2024-05-21

## 2024-05-21 RX ORDER — TIZANIDINE 2 MG/1
TABLET ORAL
COMMUNITY
Start: 2024-04-04 | End: 2024-05-21 | Stop reason: ALTCHOICE

## 2024-05-21 RX ORDER — GABAPENTIN 100 MG/1
100 CAPSULE ORAL NIGHTLY PRN
Qty: 90 CAPSULE | Refills: 0 | Status: SHIPPED | OUTPATIENT
Start: 2024-05-21 | End: 2024-11-17

## 2024-05-21 RX ORDER — TRAMADOL HYDROCHLORIDE 50 MG/1
TABLET ORAL
COMMUNITY
Start: 2024-05-20 | End: 2024-05-21 | Stop reason: ALTCHOICE

## 2024-05-21 RX ORDER — BLOOD SUGAR DIAGNOSTIC
STRIP MISCELLANEOUS
Qty: 100 STRIP | Refills: 3 | Status: SHIPPED | OUTPATIENT
Start: 2024-05-21

## 2024-05-21 RX ORDER — BLOOD-GLUCOSE CONTROL, NORMAL
EACH MISCELLANEOUS
Qty: 100 EACH | Refills: 3 | Status: SHIPPED | OUTPATIENT
Start: 2024-05-21

## 2024-05-21 RX ORDER — DEXTROSE 4 G
TABLET,CHEWABLE ORAL
Qty: 1 EACH | Refills: 0 | Status: SHIPPED | OUTPATIENT
Start: 2024-05-21

## 2024-05-21 RX ORDER — ACETAMINOPHEN 500 MG
TABLET ORAL
Qty: 1 KIT | Refills: 0 | Status: SHIPPED | OUTPATIENT
Start: 2024-05-21

## 2024-05-21 RX ORDER — LIDOCAINE 50 MG/G
PATCH TOPICAL
COMMUNITY
Start: 2024-05-03 | End: 2024-05-21 | Stop reason: WASHOUT

## 2024-05-21 ASSESSMENT — ENCOUNTER SYMPTOMS
RHINORRHEA: 0
HEMATURIA: 0
UNEXPECTED WEIGHT CHANGE: 0
POLYDIPSIA: 0
ABDOMINAL PAIN: 0
DIARRHEA: 0
DIZZINESS: 0
POLYPHAGIA: 0
HEADACHES: 0
NERVOUS/ANXIOUS: 0
CHEST TIGHTNESS: 0
ARTHRALGIAS: 0
FEVER: 0
PALPITATIONS: 0
VOMITING: 0
MYALGIAS: 0
WOUND: 0
WHEEZING: 0
DYSURIA: 0
VOICE CHANGE: 1
CONSTIPATION: 0
NAUSEA: 0
SORE THROAT: 0
DYSPHORIC MOOD: 0
COUGH: 1
NUMBNESS: 1
BLOOD IN STOOL: 0
FREQUENCY: 0
SHORTNESS OF BREATH: 0
EYE PAIN: 0
CHILLS: 0

## 2024-05-21 NOTE — PROGRESS NOTES
"Subjective   Patient ID: Najibeh Tannous is a 73 y.o. female who presents for Follow-up.    HPI     Here for followup  Sugars 120-130s  Needs new BP machine. BP at home 130s  Has a cold. Woke up with hoarse voice and dry cough. Grandkids are sick and they passed to her  Has vaginal itching. Has been 2-3 months.     Has burning in her feet. Worse at night and wants to do something about it. Keeps her up at night  Yeast infection  Review of Systems   Constitutional:  Negative for chills, fever and unexpected weight change.   HENT:  Positive for voice change. Negative for congestion, hearing loss, rhinorrhea and sore throat.    Eyes:  Negative for pain and visual disturbance.   Respiratory:  Positive for cough. Negative for chest tightness, shortness of breath and wheezing.    Cardiovascular:  Negative for chest pain and palpitations.   Gastrointestinal:  Negative for abdominal pain, blood in stool, constipation, diarrhea, nausea and vomiting.   Endocrine: Negative for cold intolerance, heat intolerance, polydipsia and polyphagia.   Genitourinary:  Negative for dysuria, frequency and hematuria.   Musculoskeletal:  Negative for arthralgias and myalgias.   Skin:  Negative for rash and wound.   Neurological:  Positive for numbness. Negative for dizziness, syncope and headaches.   Psychiatric/Behavioral:  Negative for dysphoric mood. The patient is not nervous/anxious.        Objective   /75 (BP Location: Left arm, Patient Position: Sitting, BP Cuff Size: Large adult)   Pulse 66   Temp 36.9 °C (98.5 °F)   Resp 16   Ht 1.626 m (5' 4\")   Wt 77.1 kg (170 lb)   SpO2 96%   BMI 29.18 kg/m²     Physical Exam  Constitutional:       Appearance: Normal appearance.   HENT:      Mouth/Throat:      Mouth: Mucous membranes are moist.      Pharynx: No oropharyngeal exudate or posterior oropharyngeal erythema.   Cardiovascular:      Rate and Rhythm: Normal rate and regular rhythm.      Heart sounds: Normal heart sounds. No " murmur heard.     No gallop.   Pulmonary:      Effort: Pulmonary effort is normal. No respiratory distress.      Breath sounds: Normal breath sounds.   Genitourinary:     Comments: Vulva with some erythema and satellite lesions  Musculoskeletal:      Right lower leg: No edema.      Left lower leg: No edema.   Lymphadenopathy:      Cervical: No cervical adenopathy.   Neurological:      Mental Status: She is alert.         Assessment/Plan   Problem List Items Addressed This Visit             ICD-10-CM    Benign essential hypertension (Chronic) I10    Relevant Medications    blood-glucose meter misc    blood pressure monitor (Blood Pressure Kit) kit    Type 2 diabetes mellitus with hyperglycemia, without long-term current use of insulin (Multi) (Chronic) E11.65    Relevant Medications    blood sugar diagnostic (OneTouch Ultra Test) strip    lancets (Easy Comfort Lancets) 30 gauge misc    blood-glucose meter misc    Other Relevant Orders    POCT glycosylated hemoglobin (Hb A1C) manually resulted (Completed)    CBC    Comprehensive Metabolic Panel    Hemoglobin A1C    Lipid Panel    Albumin , Urine Random     Other Visit Diagnoses         Codes    Yeast infection    -  Primary B37.9    Relevant Medications    fluconazole (Diflucan) 150 mg tablet    Menopause     Z78.0    Relevant Orders    XR DEXA bone density    Neuropathy     G62.9    Relevant Medications    gabapentin (Neurontin) 100 mg capsule    Other Relevant Orders    TSH with reflex to Free T4 if abnormal    Vitamin B12    Vitamin D deficiency     E55.9    Relevant Orders    Vitamin D 25-Hydroxy,Total (for eval of Vitamin D levels)         Vaginal itching  -dlifucan x1  -if does not help-can repeat  -if continues-send to gyn      Viral URI- discussed will get better  -call if does not    Neuropathy-trial gabapentin prn at night  -advised can make her drowsy  -likely from DM2 and b12  -can consider EMG if not improving for other causes     Neuropathy  -improving  with b12     B12 deficiency-recheck     Vitamin D deficiency-cont vitamin D      Hx of spinal stenosis s/p L4/L5 laminectomy (8/19)  MRI spine 1/31/20: postsurgical changes, re-demonstrated lumbar spondylosis and grade 1 L4/L5 anterolitsthesis with moderate narrowing.     Hypertension: controlled on Norvasc     ELbow tendonitis: stop knitting, compression sleeve     Diabetes type 2: a1c=5.8--> 6.3--> 6.5--> 6.0--> 6.3--> 6.7--> 6.5  <130 as she is having low sugars  -actos with edema  -januvia caused stomach issues  -on glyburide  -does not want metformin  -a1c 3 months     Epigastric pain  -add carafate  -cont PPI  - -discussed foods to avoid like citric fruits (oranges, grapefruit, phillip, and lime), chocolate, coffee, spicy foods, tomatoes and tomato products, alcohol, etc. Avoid eating late at night and elevate the head of the bed.  -send to GI for EGD        Hyperlipidemia: has had issues with statins  -controlled on crestor      Right thumb trapeziectomy 6/2020; seeing ortho  -voltaren gel as needed--did not work  -advised to see ortho  -cannot do po NSAIDs with allergy  -saw ortho and told to do PT only (not not finished)  -limited with meds and cannot do NSAIDs. Tramadol does not help  -states now fine     S/p rotator cuff repair: hurts with fall-advised to go to Dr. Harris     GERD: controlled on PPI     Itchy skin: send to derm, trial steroid     Eye issues: on eye drops per eye doc     f/u 3-4months with labs before. Mammo and dexa ordered     Health Maintenance  -Pap smear: >65  -Vaccinations: Pneumovax UTD, Declines shingrix. ADvised tdap. Flu UTD  -Mammogram: 7/21- normal, ordered  -Colonoscopy: 7/5/17--repeat 10 years (Rebecca Dominguez--got records)  -DEXA: 8/20-osteopenia, repeat 2 years (8/22)

## 2024-06-04 DIAGNOSIS — E53.8 B12 DEFICIENCY: ICD-10-CM

## 2024-06-04 DIAGNOSIS — E11.9 TYPE 2 DIABETES MELLITUS WITHOUT COMPLICATION, UNSPECIFIED WHETHER LONG TERM INSULIN USE (MULTI): ICD-10-CM

## 2024-06-04 DIAGNOSIS — S09.93XD FACIAL TRAUMA, SUBSEQUENT ENCOUNTER: ICD-10-CM

## 2024-06-04 DIAGNOSIS — R05.3 CHRONIC COUGH: ICD-10-CM

## 2024-06-04 DIAGNOSIS — E78.5 HYPERLIPIDEMIA, MILD: ICD-10-CM

## 2024-06-05 RX ORDER — ALBUTEROL SULFATE 90 UG/1
1-2 AEROSOL, METERED RESPIRATORY (INHALATION) EVERY 6 HOURS PRN
Qty: 18 G | Refills: 0 | Status: SHIPPED | OUTPATIENT
Start: 2024-06-05 | End: 2025-06-05

## 2024-06-05 RX ORDER — CHOLECALCIFEROL (VITAMIN D3) 50 MCG
TABLET ORAL
Qty: 180 TABLET | Refills: 2 | Status: SHIPPED | OUTPATIENT
Start: 2024-06-05

## 2024-06-05 RX ORDER — ACETAMINOPHEN 500 MG
1000 TABLET ORAL 3 TIMES DAILY
Qty: 90 TABLET | Refills: 0 | Status: SHIPPED | OUTPATIENT
Start: 2024-06-05

## 2024-06-05 RX ORDER — GLYBURIDE 2.5 MG/1
5 TABLET ORAL DAILY
Qty: 180 TABLET | Refills: 1 | Status: SHIPPED | OUTPATIENT
Start: 2024-06-05

## 2024-06-05 RX ORDER — ROSUVASTATIN CALCIUM 20 MG/1
20 TABLET, COATED ORAL DAILY
Qty: 90 TABLET | Refills: 2 | Status: SHIPPED | OUTPATIENT
Start: 2024-06-05

## 2024-06-13 ENCOUNTER — HOSPITAL ENCOUNTER (OUTPATIENT)
Dept: RADIOLOGY | Facility: CLINIC | Age: 74
Discharge: HOME | End: 2024-06-13
Payer: MEDICAID

## 2024-06-13 VITALS — WEIGHT: 169.97 LBS | BODY MASS INDEX: 29.02 KG/M2 | HEIGHT: 64 IN

## 2024-06-13 DIAGNOSIS — Z78.0 MENOPAUSE: ICD-10-CM

## 2024-06-13 DIAGNOSIS — Z12.31 ENCOUNTER FOR SCREENING MAMMOGRAM FOR MALIGNANT NEOPLASM OF BREAST: ICD-10-CM

## 2024-06-13 PROCEDURE — 77080 DXA BONE DENSITY AXIAL: CPT

## 2024-06-13 PROCEDURE — 77063 BREAST TOMOSYNTHESIS BI: CPT

## 2024-06-29 DIAGNOSIS — S09.93XD FACIAL TRAUMA, SUBSEQUENT ENCOUNTER: ICD-10-CM

## 2024-06-29 DIAGNOSIS — E55.9 VITAMIN D DEFICIENCY: ICD-10-CM

## 2024-06-29 DIAGNOSIS — I10 BENIGN ESSENTIAL HTN: ICD-10-CM

## 2024-06-29 DIAGNOSIS — R05.3 CHRONIC COUGH: ICD-10-CM

## 2024-06-29 DIAGNOSIS — R12 HEARTBURN: ICD-10-CM

## 2024-07-01 RX ORDER — SUCRALFATE 1 G/1
1 TABLET ORAL
Qty: 120 TABLET | Refills: 1 | Status: SHIPPED | OUTPATIENT
Start: 2024-07-01 | End: 2025-07-01

## 2024-07-01 RX ORDER — AMLODIPINE BESYLATE 5 MG/1
5 TABLET ORAL DAILY
Qty: 90 TABLET | Refills: 0 | Status: SHIPPED | OUTPATIENT
Start: 2024-07-01 | End: 2024-12-28

## 2024-07-01 RX ORDER — ALBUTEROL SULFATE 90 UG/1
1-2 AEROSOL, METERED RESPIRATORY (INHALATION) EVERY 6 HOURS PRN
Qty: 18 G | Refills: 0 | Status: SHIPPED | OUTPATIENT
Start: 2024-07-01 | End: 2025-07-01

## 2024-07-01 RX ORDER — ERGOCALCIFEROL 1.25 MG/1
50000 CAPSULE ORAL
Qty: 12 CAPSULE | Refills: 0 | Status: SHIPPED | OUTPATIENT
Start: 2024-07-07 | End: 2024-09-29

## 2024-07-01 RX ORDER — ACETAMINOPHEN 500 MG
1000 TABLET ORAL 3 TIMES DAILY
Qty: 90 TABLET | Refills: 0 | Status: SHIPPED | OUTPATIENT
Start: 2024-07-01

## 2024-08-12 DIAGNOSIS — R12 HEARTBURN: ICD-10-CM

## 2024-08-13 RX ORDER — PANTOPRAZOLE SODIUM 40 MG/1
40 TABLET, DELAYED RELEASE ORAL 2 TIMES DAILY
Qty: 180 TABLET | Refills: 0 | Status: SHIPPED | OUTPATIENT
Start: 2024-08-13 | End: 2024-11-11

## 2024-08-23 DIAGNOSIS — R05.3 CHRONIC COUGH: ICD-10-CM

## 2024-08-23 DIAGNOSIS — S09.93XD FACIAL TRAUMA, SUBSEQUENT ENCOUNTER: ICD-10-CM

## 2024-08-23 DIAGNOSIS — R12 HEARTBURN: ICD-10-CM

## 2024-08-26 RX ORDER — ALBUTEROL SULFATE 90 UG/1
1-2 INHALANT RESPIRATORY (INHALATION) EVERY 6 HOURS PRN
Qty: 18 G | Refills: 0 | Status: SHIPPED | OUTPATIENT
Start: 2024-08-26 | End: 2025-08-26

## 2024-08-26 RX ORDER — ACETAMINOPHEN 500 MG
1000 TABLET ORAL 3 TIMES DAILY
Qty: 90 TABLET | Refills: 0 | Status: SHIPPED | OUTPATIENT
Start: 2024-08-26

## 2024-08-26 RX ORDER — SUCRALFATE 1 G/1
1 TABLET ORAL
Qty: 120 TABLET | Refills: 1 | Status: SHIPPED | OUTPATIENT
Start: 2024-08-26 | End: 2025-08-26

## 2024-09-05 DIAGNOSIS — S09.93XD FACIAL TRAUMA, SUBSEQUENT ENCOUNTER: ICD-10-CM

## 2024-09-05 DIAGNOSIS — R05.3 CHRONIC COUGH: ICD-10-CM

## 2024-09-06 RX ORDER — ALBUTEROL SULFATE 90 UG/1
1-2 INHALANT RESPIRATORY (INHALATION) EVERY 6 HOURS PRN
Qty: 18 G | Refills: 0 | Status: SHIPPED | OUTPATIENT
Start: 2024-09-06 | End: 2025-09-06

## 2024-09-06 RX ORDER — ACETAMINOPHEN 500 MG
1000 TABLET ORAL 3 TIMES DAILY
Qty: 90 TABLET | Refills: 0 | Status: SHIPPED | OUTPATIENT
Start: 2024-09-06

## 2024-09-19 DIAGNOSIS — I10 BENIGN ESSENTIAL HTN: ICD-10-CM

## 2024-09-19 DIAGNOSIS — S09.93XD FACIAL TRAUMA, SUBSEQUENT ENCOUNTER: ICD-10-CM

## 2024-09-19 DIAGNOSIS — R12 HEARTBURN: ICD-10-CM

## 2024-09-19 DIAGNOSIS — R05.3 CHRONIC COUGH: ICD-10-CM

## 2024-09-19 DIAGNOSIS — E55.9 VITAMIN D DEFICIENCY: ICD-10-CM

## 2024-09-19 DIAGNOSIS — E11.65 TYPE 2 DIABETES MELLITUS WITH HYPERGLYCEMIA, WITHOUT LONG-TERM CURRENT USE OF INSULIN: Chronic | ICD-10-CM

## 2024-09-20 RX ORDER — BLOOD SUGAR DIAGNOSTIC
STRIP MISCELLANEOUS
Qty: 100 STRIP | Refills: 0 | Status: SHIPPED | OUTPATIENT
Start: 2024-09-20

## 2024-09-20 RX ORDER — PANTOPRAZOLE SODIUM 40 MG/1
40 TABLET, DELAYED RELEASE ORAL 2 TIMES DAILY
Qty: 180 TABLET | Refills: 0 | Status: SHIPPED | OUTPATIENT
Start: 2024-09-20 | End: 2024-12-19

## 2024-09-20 RX ORDER — ALBUTEROL SULFATE 90 UG/1
1-2 INHALANT RESPIRATORY (INHALATION) EVERY 6 HOURS PRN
Qty: 18 G | Refills: 0 | Status: SHIPPED | OUTPATIENT
Start: 2024-09-20 | End: 2025-09-20

## 2024-09-20 RX ORDER — ERGOCALCIFEROL 1.25 MG/1
50000 CAPSULE ORAL
Qty: 12 CAPSULE | Refills: 0 | Status: SHIPPED | OUTPATIENT
Start: 2024-09-22 | End: 2024-12-15

## 2024-09-20 RX ORDER — AMLODIPINE BESYLATE 5 MG/1
5 TABLET ORAL DAILY
Qty: 90 TABLET | Refills: 0 | Status: SHIPPED | OUTPATIENT
Start: 2024-09-20 | End: 2025-03-19

## 2024-09-20 RX ORDER — ACETAMINOPHEN 500 MG
1000 TABLET ORAL 3 TIMES DAILY
Qty: 90 TABLET | Refills: 0 | Status: SHIPPED | OUTPATIENT
Start: 2024-09-20

## 2024-10-11 ENCOUNTER — APPOINTMENT (OUTPATIENT)
Dept: CARDIOLOGY | Facility: HOSPITAL | Age: 74
End: 2024-10-11
Payer: MEDICAID

## 2024-10-11 ENCOUNTER — HOSPITAL ENCOUNTER (EMERGENCY)
Facility: HOSPITAL | Age: 74
Discharge: HOME | End: 2024-10-11
Attending: EMERGENCY MEDICINE
Payer: MEDICAID

## 2024-10-11 ENCOUNTER — APPOINTMENT (OUTPATIENT)
Dept: RADIOLOGY | Facility: HOSPITAL | Age: 74
End: 2024-10-11
Payer: MEDICAID

## 2024-10-11 VITALS
RESPIRATION RATE: 14 BRPM | BODY MASS INDEX: 28.17 KG/M2 | SYSTOLIC BLOOD PRESSURE: 148 MMHG | HEART RATE: 76 BPM | DIASTOLIC BLOOD PRESSURE: 65 MMHG | TEMPERATURE: 99.5 F | HEIGHT: 64 IN | WEIGHT: 165 LBS | OXYGEN SATURATION: 97 %

## 2024-10-11 DIAGNOSIS — U07.1 COVID-19 VIRUS INFECTION: Primary | ICD-10-CM

## 2024-10-11 DIAGNOSIS — R30.0 DYSURIA: ICD-10-CM

## 2024-10-11 LAB
ALBUMIN SERPL BCP-MCNC: 4.1 G/DL (ref 3.4–5)
ALP SERPL-CCNC: 56 U/L (ref 33–136)
ALT SERPL W P-5'-P-CCNC: 15 U/L (ref 7–45)
ANION GAP SERPL CALC-SCNC: 11 MMOL/L (ref 10–20)
APPEARANCE UR: CLEAR
AST SERPL W P-5'-P-CCNC: 19 U/L (ref 9–39)
ATRIAL RATE: 83 BPM
BASOPHILS # BLD AUTO: 0.04 X10*3/UL (ref 0–0.1)
BASOPHILS NFR BLD AUTO: 0.6 %
BILIRUB SERPL-MCNC: 0.5 MG/DL (ref 0–1.2)
BILIRUB UR STRIP.AUTO-MCNC: NEGATIVE MG/DL
BUN SERPL-MCNC: 13 MG/DL (ref 6–23)
CALCIUM SERPL-MCNC: 9 MG/DL (ref 8.6–10.3)
CHLORIDE SERPL-SCNC: 105 MMOL/L (ref 98–107)
CO2 SERPL-SCNC: 27 MMOL/L (ref 21–32)
COLOR UR: ABNORMAL
CREAT SERPL-MCNC: 0.88 MG/DL (ref 0.5–1.05)
EGFRCR SERPLBLD CKD-EPI 2021: 69 ML/MIN/1.73M*2
EOSINOPHIL # BLD AUTO: 0.02 X10*3/UL (ref 0–0.4)
EOSINOPHIL NFR BLD AUTO: 0.3 %
ERYTHROCYTE [DISTWIDTH] IN BLOOD BY AUTOMATED COUNT: 14.4 % (ref 11.5–14.5)
FLUAV RNA RESP QL NAA+PROBE: NOT DETECTED
FLUBV RNA RESP QL NAA+PROBE: NOT DETECTED
GLUCOSE SERPL-MCNC: 137 MG/DL (ref 74–99)
GLUCOSE UR STRIP.AUTO-MCNC: NORMAL MG/DL
HCT VFR BLD AUTO: 38.3 % (ref 36–46)
HGB BLD-MCNC: 12 G/DL (ref 12–16)
IMM GRANULOCYTES # BLD AUTO: 0.01 X10*3/UL (ref 0–0.5)
IMM GRANULOCYTES NFR BLD AUTO: 0.2 % (ref 0–0.9)
KETONES UR STRIP.AUTO-MCNC: NEGATIVE MG/DL
LEUKOCYTE ESTERASE UR QL STRIP.AUTO: ABNORMAL
LYMPHOCYTES # BLD AUTO: 0.42 X10*3/UL (ref 0.8–3)
LYMPHOCYTES NFR BLD AUTO: 6.5 %
MAGNESIUM SERPL-MCNC: 1.68 MG/DL (ref 1.6–2.4)
MCH RBC QN AUTO: 24.8 PG (ref 26–34)
MCHC RBC AUTO-ENTMCNC: 31.3 G/DL (ref 32–36)
MCV RBC AUTO: 79 FL (ref 80–100)
MONOCYTES # BLD AUTO: 0.66 X10*3/UL (ref 0.05–0.8)
MONOCYTES NFR BLD AUTO: 10.2 %
MUCOUS THREADS #/AREA URNS AUTO: ABNORMAL /LPF
NEUTROPHILS # BLD AUTO: 5.34 X10*3/UL (ref 1.6–5.5)
NEUTROPHILS NFR BLD AUTO: 82.2 %
NITRITE UR QL STRIP.AUTO: NEGATIVE
NRBC BLD-RTO: 0 /100 WBCS (ref 0–0)
P AXIS: 60 DEGREES
P OFFSET: 193 MS
P ONSET: 129 MS
PH UR STRIP.AUTO: 7.5 [PH]
PLATELET # BLD AUTO: 134 X10*3/UL (ref 150–450)
POTASSIUM SERPL-SCNC: 3.8 MMOL/L (ref 3.5–5.3)
PR INTERVAL: 184 MS
PROT SERPL-MCNC: 7.2 G/DL (ref 6.4–8.2)
PROT UR STRIP.AUTO-MCNC: ABNORMAL MG/DL
Q ONSET: 221 MS
QRS COUNT: 14 BEATS
QRS DURATION: 74 MS
QT INTERVAL: 344 MS
QTC CALCULATION(BAZETT): 404 MS
QTC FREDERICIA: 383 MS
R AXIS: -19 DEGREES
RBC # BLD AUTO: 4.84 X10*6/UL (ref 4–5.2)
RBC # UR STRIP.AUTO: NEGATIVE /UL
RBC #/AREA URNS AUTO: ABNORMAL /HPF
SARS-COV-2 RNA RESP QL NAA+PROBE: DETECTED
SODIUM SERPL-SCNC: 139 MMOL/L (ref 136–145)
SP GR UR STRIP.AUTO: 1.01
SQUAMOUS #/AREA URNS AUTO: ABNORMAL /HPF
T AXIS: 84 DEGREES
T OFFSET: 393 MS
UROBILINOGEN UR STRIP.AUTO-MCNC: NORMAL MG/DL
VENTRICULAR RATE: 83 BPM
WBC # BLD AUTO: 6.5 X10*3/UL (ref 4.4–11.3)
WBC #/AREA URNS AUTO: ABNORMAL /HPF

## 2024-10-11 PROCEDURE — 36415 COLL VENOUS BLD VENIPUNCTURE: CPT

## 2024-10-11 PROCEDURE — 2500000001 HC RX 250 WO HCPCS SELF ADMINISTERED DRUGS (ALT 637 FOR MEDICARE OP)

## 2024-10-11 PROCEDURE — 87636 SARSCOV2 & INF A&B AMP PRB: CPT

## 2024-10-11 PROCEDURE — 71045 X-RAY EXAM CHEST 1 VIEW: CPT

## 2024-10-11 PROCEDURE — 71045 X-RAY EXAM CHEST 1 VIEW: CPT | Performed by: RADIOLOGY

## 2024-10-11 PROCEDURE — 81001 URINALYSIS AUTO W/SCOPE: CPT | Performed by: EMERGENCY MEDICINE

## 2024-10-11 PROCEDURE — 85025 COMPLETE CBC W/AUTO DIFF WBC: CPT

## 2024-10-11 PROCEDURE — 87086 URINE CULTURE/COLONY COUNT: CPT | Mod: STJLAB | Performed by: EMERGENCY MEDICINE

## 2024-10-11 PROCEDURE — 83735 ASSAY OF MAGNESIUM: CPT

## 2024-10-11 PROCEDURE — 99285 EMERGENCY DEPT VISIT HI MDM: CPT

## 2024-10-11 PROCEDURE — 93005 ELECTROCARDIOGRAM TRACING: CPT

## 2024-10-11 PROCEDURE — 99285 EMERGENCY DEPT VISIT HI MDM: CPT | Performed by: EMERGENCY MEDICINE

## 2024-10-11 PROCEDURE — 80053 COMPREHEN METABOLIC PANEL: CPT

## 2024-10-11 PROCEDURE — 2550000001 HC RX 255 CONTRASTS: Performed by: EMERGENCY MEDICINE

## 2024-10-11 PROCEDURE — 74177 CT ABD & PELVIS W/CONTRAST: CPT | Performed by: RADIOLOGY

## 2024-10-11 PROCEDURE — 74177 CT ABD & PELVIS W/CONTRAST: CPT

## 2024-10-11 RX ORDER — NITROFURANTOIN 25; 75 MG/1; MG/1
100 CAPSULE ORAL 2 TIMES DAILY
Qty: 10 CAPSULE | Refills: 0 | Status: SHIPPED | OUTPATIENT
Start: 2024-10-11 | End: 2024-10-16

## 2024-10-11 RX ORDER — AMOXICILLIN AND CLAVULANATE POTASSIUM 875; 125 MG/1; MG/1
1 TABLET, FILM COATED ORAL ONCE
Status: COMPLETED | OUTPATIENT
Start: 2024-10-11 | End: 2024-10-11

## 2024-10-11 ASSESSMENT — LIFESTYLE VARIABLES
EVER FELT BAD OR GUILTY ABOUT YOUR DRINKING: NO
HAVE YOU EVER FELT YOU SHOULD CUT DOWN ON YOUR DRINKING: NO
EVER HAD A DRINK FIRST THING IN THE MORNING TO STEADY YOUR NERVES TO GET RID OF A HANGOVER: NO
TOTAL SCORE: 0
HAVE PEOPLE ANNOYED YOU BY CRITICIZING YOUR DRINKING: NO

## 2024-10-11 ASSESSMENT — COLUMBIA-SUICIDE SEVERITY RATING SCALE - C-SSRS
6. HAVE YOU EVER DONE ANYTHING, STARTED TO DO ANYTHING, OR PREPARED TO DO ANYTHING TO END YOUR LIFE?: NO
1. IN THE PAST MONTH, HAVE YOU WISHED YOU WERE DEAD OR WISHED YOU COULD GO TO SLEEP AND NOT WAKE UP?: NO
2. HAVE YOU ACTUALLY HAD ANY THOUGHTS OF KILLING YOURSELF?: NO

## 2024-10-11 ASSESSMENT — PAIN - FUNCTIONAL ASSESSMENT: PAIN_FUNCTIONAL_ASSESSMENT: 0-10

## 2024-10-11 ASSESSMENT — PAIN SCALES - GENERAL: PAINLEVEL_OUTOF10: 10 - WORST POSSIBLE PAIN

## 2024-10-11 NOTE — ED PROVIDER NOTES
"Emergency Department Provider Note        History of Present Illness     History provided by: Patient and Family Member  Limitations to History: None  External Records Reviewed with Brief Summary: None    HPI:  Najibeh Tannous is a 73 y.o. female PMHx of HTN, DM, HLD who arrives to the emergency department with her daughter with a chief complaint of generalized bodyaches.  The patient's daughter states the patient has no significant medical history other than hypertension non-insulin-dependent diabetes and hyperlipidemia.  She states the patient told her yesterday that she had noticed body aches, stating that she hurt even in her \"fingernails\".  Patient confirms she indeed has full body myalgias including her \"fingernails\".  The patient's daughter states the patient also told her that she was having burning on urination yesterday.  Patient's daughter states that as of this morning the patient stated that she could not wait to go to the urgent care as they had discussed yesterday and that she insisted on being brought immediately to the emergency department.  The patient states that she has pain all over, is unable to localize her pain, also complains of generalized malaise and fatigue.    Physical Exam   Triage vitals:  T 37.5 °C (99.5 °F)  HR 86  /74  RR 15  O2 100 % None (Room air)    General: Awake, alert, in no acute distress  Eyes: Gaze conjugate.  No scleral icterus or injection  HENT: Normo-cephalic, atraumatic. No stridor  CV: Regular rate, regular rhythm. Radial pulses 2+ bilaterally  Resp: Breathing non-labored, speaking in full sentences.  Clear to auscultation bilaterally  GI: Soft, non-distended, mild tenderness to left flank, otherwise non-tender. No rebound or guarding.  : Deferred  MSK/Extremities: No gross bony deformities. Moving all extremities  Skin: Warm. Appropriate color  Neuro: Alert. Oriented. Face symmetric. Speech is fluent.  Gross strength and sensation intact in b/l UE and " LEs  Psych: Appropriate mood and affect    Medical Decision Making & ED Course   Medical Decision Makin y.o. female arrives with complaint of 1 episode of dysuria yesterday, generalized malaise and bodyaches, denies nausea or vomiting.  She is tender to palpation over the left flank.  Due to this CT of the abdomen pelvis was ordered to evaluate for ureterolithiasis or diverticulosis or diverticulitis.  She was swabbed for influenza and COVID, CBC CMP, lipase ordered as well as urinalysis.  Please see ED course for further medical decision making.  Ultimately the patient was found to be COVID-positive.  Due to the patient's complaint of urinary tract symptoms at this time we will treat empirically with antibiotics.  The patient was given a dose of antibiotic at this facility and also discharged home with a course sent to her pharmacy.  Please see ED course for further medical decision making.  Ultimately patient was released to her daughter and given strict return precautions will follow-up with primary care.  ----      Differential diagnoses considered include but are not limited to: Urinary tract infection, upper respiratory infection, ureterolithiasis, diverticulosis, gastritis, pancreatitis     Social Determinants of Health which Significantly Impact Care: None identified   Independent Result Review and Interpretation: Relevant laboratory and radiographic results were reviewed and independently interpreted by myself.  As necessary, they are commented on in the ED Course.    Chronic conditions affecting the patient's care: As documented above in [facility]    The patient was discussed with the following consultants/services: None    Care Considerations: As documented above in [facility]    ED Course:  ED Course as of 10/11/24 1736   Fri Oct 11, 2024   1107 Leukocyte Esterase, Urine(!): 250 Jacquelin/µL [PV]   1152 Per my interpretation of the chest x-ray there is no signs of pneumonia or pneumothorax.  The patient not  complaining of any difficulty breathing.  She complains of generalized weakness and generalized body pains and myalgias.  White count 6.5, no leukocytosis to indicate systemic infection. [PV]   1153 There is no neutrophilic predominance ANC 5.34, low concern for systemic infection. [PV]   1313 Coronavirus 2019, PCR(!): Detected  Patient's urine sample only showed mild leukocyte esterase, no bacteria noted in urine.  WBC 6-10.   [PV]   1419 Patient did complain of dysuria prior to arrival, we will begin treatment with Augmentin due to the patient's complaint of urinary tract symptoms.  We are still pending results of CT of the abdomen pelvis at this time.  Most likely patient's general malaise is attributed to COVID infection.  This is consistent with her chief complaint of body myalgias. [PV]   1451 CT abdomen and pelvis shows IMPRESSION:  No evidence for acute pathology within the abdomen or pelvis.No hydronephrosis or perinephric inflammatory changes bilaterally. 2  mm nonobstructive calculus in the upper pole of the right kidney.      Diverticulosis of the colon without evidence for acute diverticulitis.   [PV]      ED Course User Index  [PV] Tanvir Zavala DO         Diagnoses as of 10/11/24 1736   COVID-19 virus infection   Dysuria     Disposition   As a result of the work-up, the patient was discharged home.  she was informed of her diagnosis and instructed to come back with any concerns or worsening of condition.  she and was agreeable to the plan as discussed above.  she was given the opportunity to ask questions.  All of the patient's questions were answered.    Procedures   Procedures    Patient seen and discussed with ED attending physician.    Tanvir Zavala DO  Emergency Medicine     Tanvir Zavala DO  Resident  10/11/24 1740      I performed a history and physical examination of Najibeh Tannous and discussed her management with Dr. Zavala.  I agree with the history, physical, assessment, and  plan of care, with the following exceptions: None    I was present for the following procedures: None  Time Spent in Critical Care of the patient: None  Time spent in discussions with the patient and family: 15 minutes    Covid +, nontoxic, no pneumonia, no indication for antivirals or admission. Stable VS.    MD Charline Titus MD  10/14/24 0967

## 2024-10-12 LAB — BACTERIA UR CULT: NORMAL

## 2024-10-18 DIAGNOSIS — R05.3 CHRONIC COUGH: ICD-10-CM

## 2024-10-18 DIAGNOSIS — R12 HEARTBURN: ICD-10-CM

## 2024-10-18 DIAGNOSIS — S09.93XD FACIAL TRAUMA, SUBSEQUENT ENCOUNTER: ICD-10-CM

## 2024-10-18 DIAGNOSIS — E11.9 TYPE 2 DIABETES MELLITUS WITHOUT COMPLICATION, UNSPECIFIED WHETHER LONG TERM INSULIN USE (MULTI): ICD-10-CM

## 2024-10-20 RX ORDER — ALBUTEROL SULFATE 90 UG/1
1-2 INHALANT RESPIRATORY (INHALATION) EVERY 6 HOURS PRN
Qty: 18 G | Refills: 0 | Status: SHIPPED | OUTPATIENT
Start: 2024-10-20 | End: 2025-10-20

## 2024-10-20 RX ORDER — ACETAMINOPHEN 500 MG
1000 TABLET ORAL 3 TIMES DAILY
Qty: 90 TABLET | Refills: 0 | Status: SHIPPED | OUTPATIENT
Start: 2024-10-20 | End: 2024-10-22 | Stop reason: WASHOUT

## 2024-10-21 RX ORDER — SUCRALFATE 1 G/1
1 TABLET ORAL
Qty: 120 TABLET | Refills: 1 | Status: SHIPPED | OUTPATIENT
Start: 2024-10-21 | End: 2024-10-22 | Stop reason: ALTCHOICE

## 2024-10-21 RX ORDER — CETIRIZINE HYDROCHLORIDE 10 MG/1
10 TABLET ORAL DAILY
Qty: 90 TABLET | Refills: 1 | Status: SHIPPED | OUTPATIENT
Start: 2024-10-21 | End: 2025-04-19

## 2024-10-21 RX ORDER — GLYBURIDE 2.5 MG/1
5 TABLET ORAL DAILY
Qty: 120 TABLET | Refills: 1 | Status: SHIPPED | OUTPATIENT
Start: 2024-10-21

## 2024-10-22 ENCOUNTER — APPOINTMENT (OUTPATIENT)
Dept: PRIMARY CARE | Facility: CLINIC | Age: 74
End: 2024-10-22
Payer: MEDICAID

## 2024-10-22 ENCOUNTER — LAB (OUTPATIENT)
Dept: LAB | Facility: LAB | Age: 74
End: 2024-10-22
Payer: MEDICAID

## 2024-10-22 VITALS
OXYGEN SATURATION: 96 % | SYSTOLIC BLOOD PRESSURE: 123 MMHG | HEIGHT: 64 IN | RESPIRATION RATE: 16 BRPM | DIASTOLIC BLOOD PRESSURE: 71 MMHG | WEIGHT: 169 LBS | BODY MASS INDEX: 28.85 KG/M2 | HEART RATE: 56 BPM | TEMPERATURE: 98.2 F

## 2024-10-22 DIAGNOSIS — I47.19 ATRIAL TACHYCARDIA, PAROXYSMAL (CMS-HCC): ICD-10-CM

## 2024-10-22 DIAGNOSIS — J01.20 ACUTE NON-RECURRENT ETHMOIDAL SINUSITIS: ICD-10-CM

## 2024-10-22 DIAGNOSIS — E11.65 TYPE 2 DIABETES MELLITUS WITH HYPERGLYCEMIA, WITHOUT LONG-TERM CURRENT USE OF INSULIN: ICD-10-CM

## 2024-10-22 DIAGNOSIS — I10 BENIGN ESSENTIAL HYPERTENSION: Chronic | ICD-10-CM

## 2024-10-22 DIAGNOSIS — E55.9 VITAMIN D DEFICIENCY: ICD-10-CM

## 2024-10-22 DIAGNOSIS — E11.65 TYPE 2 DIABETES MELLITUS WITH HYPERGLYCEMIA, WITHOUT LONG-TERM CURRENT USE OF INSULIN: Chronic | ICD-10-CM

## 2024-10-22 DIAGNOSIS — G89.29 CHRONIC PAIN OF RIGHT KNEE: Primary | ICD-10-CM

## 2024-10-22 DIAGNOSIS — M25.561 CHRONIC PAIN OF RIGHT KNEE: Primary | ICD-10-CM

## 2024-10-22 DIAGNOSIS — L30.9 DERMATITIS: ICD-10-CM

## 2024-10-22 DIAGNOSIS — G62.9 NEUROPATHY: ICD-10-CM

## 2024-10-22 DIAGNOSIS — N89.8 VAGINAL ITCHING: ICD-10-CM

## 2024-10-22 PROBLEM — R39.9 SYMPTOMS INVOLVING URINARY SYSTEM: Status: ACTIVE | Noted: 2022-10-12

## 2024-10-22 PROBLEM — R20.9 SKIN SENSATION DISTURBANCE: Status: ACTIVE | Noted: 2024-07-10

## 2024-10-22 LAB
ATRIAL RATE: 83 BPM
P AXIS: 60 DEGREES
P OFFSET: 193 MS
P ONSET: 129 MS
POC HEMOGLOBIN A1C: 6.7 % (ref 4.2–6.5)
PR INTERVAL: 184 MS
Q ONSET: 221 MS
QRS COUNT: 14 BEATS
QRS DURATION: 74 MS
QT INTERVAL: 344 MS
QTC CALCULATION(BAZETT): 404 MS
QTC FREDERICIA: 383 MS
R AXIS: -19 DEGREES
T AXIS: 84 DEGREES
T OFFSET: 393 MS
VENTRICULAR RATE: 83 BPM

## 2024-10-22 PROCEDURE — 99214 OFFICE O/P EST MOD 30 MIN: CPT | Performed by: INTERNAL MEDICINE

## 2024-10-22 PROCEDURE — 3078F DIAST BP <80 MM HG: CPT | Performed by: INTERNAL MEDICINE

## 2024-10-22 PROCEDURE — 1159F MED LIST DOCD IN RCRD: CPT | Performed by: INTERNAL MEDICINE

## 2024-10-22 PROCEDURE — 3008F BODY MASS INDEX DOCD: CPT | Performed by: INTERNAL MEDICINE

## 2024-10-22 PROCEDURE — 80061 LIPID PANEL: CPT

## 2024-10-22 PROCEDURE — 1160F RVW MEDS BY RX/DR IN RCRD: CPT | Performed by: INTERNAL MEDICINE

## 2024-10-22 PROCEDURE — 83036 HEMOGLOBIN GLYCOSYLATED A1C: CPT | Performed by: INTERNAL MEDICINE

## 2024-10-22 PROCEDURE — 82607 VITAMIN B-12: CPT

## 2024-10-22 PROCEDURE — 3074F SYST BP LT 130 MM HG: CPT | Performed by: INTERNAL MEDICINE

## 2024-10-22 PROCEDURE — 82306 VITAMIN D 25 HYDROXY: CPT

## 2024-10-22 PROCEDURE — 1036F TOBACCO NON-USER: CPT | Performed by: INTERNAL MEDICINE

## 2024-10-22 PROCEDURE — 36415 COLL VENOUS BLD VENIPUNCTURE: CPT

## 2024-10-22 PROCEDURE — 84443 ASSAY THYROID STIM HORMONE: CPT

## 2024-10-22 RX ORDER — TRIAMCINOLONE ACETONIDE 1 MG/G
CREAM TOPICAL 2 TIMES DAILY
Qty: 15 G | Refills: 0 | Status: SHIPPED | OUTPATIENT
Start: 2024-10-22

## 2024-10-22 RX ORDER — AMOXICILLIN 875 MG/1
875 TABLET, FILM COATED ORAL 2 TIMES DAILY
Qty: 20 TABLET | Refills: 0 | Status: SHIPPED | OUTPATIENT
Start: 2024-10-22 | End: 2024-11-01

## 2024-10-22 NOTE — PROGRESS NOTES
"Subjective   Patient ID: Najibeh Tannous is a 73 y.o. female who presents for Follow-up.    HPI      Here for followup  States still has vaginal itching  Antifungal did not help    Had COVID. Has sinus pressure and not getting better  She has right knee pain and does not want meds. Tylenol helps some. No injury. States just aches and feels a little swollen    She states only taking pantoprazole daily. Not using carafate    Sugars doing well    Review of Systems   All other systems reviewed and are negative.      Objective   /71 (BP Location: Left arm, Patient Position: Sitting, BP Cuff Size: Large adult)   Pulse 56   Temp 36.8 °C (98.2 °F) (Temporal)   Resp 16   Ht 1.626 m (5' 4\")   Wt 76.7 kg (169 lb)   SpO2 96%   BMI 29.01 kg/m²     Physical Exam  Constitutional:       Appearance: Normal appearance.   Cardiovascular:      Rate and Rhythm: Normal rate and regular rhythm.      Heart sounds: Normal heart sounds. No murmur heard.     No gallop.   Pulmonary:      Effort: Pulmonary effort is normal. No respiratory distress.      Breath sounds: Normal breath sounds.   Musculoskeletal:      Right lower leg: No edema.      Left lower leg: No edema.      Comments: Right knee- slight swelling, ROM normal  Left knee-normal   Neurological:      Mental Status: She is alert.         Assessment/Plan   Problem List Items Addressed This Visit             ICD-10-CM    Benign essential hypertension (Chronic) I10    Type 2 diabetes mellitus with hyperglycemia, without long-term current use of insulin (Chronic) E11.65    Relevant Orders    POCT glycosylated hemoglobin (Hb A1C) manually resulted (Completed)     Other Visit Diagnoses         Codes    Chronic pain of right knee    -  Primary M25.561, G89.29    Relevant Orders    Referral to Physical Therapy    Dermatitis     L30.9    Relevant Medications    triamcinolone (Kenalog) 0.1 % cream    Vaginal itching     N89.8    Relevant Orders    Referral to Gynecology    Acute " non-recurrent ethmoidal sinusitis     J01.20    Relevant Medications    amoxicillin (Amoxil) 875 mg tablet             Vaginal itching  Send to gyn  -if continues-send to gyn    Knee pain-send to PT  -declined orthopedics        Acute sinusitis post COVID  -amoxicillin      Neuropathy  -improving with b12     B12 deficiency-recheck  -never did labs     Vitamin D deficiency-cont vitamin D      Hx of spinal stenosis s/p L4/L5 laminectomy (8/19)  MRI spine 1/31/20: postsurgical changes, re-demonstrated lumbar spondylosis and grade 1 L4/L5 anterolitsthesis with moderate narrowing.     Hypertension: controlled on Norvasc     ELbow tendonitis: stop knitting, compression sleeve     Diabetes type 2: a1c=5.8--> 6.3--> 6.5--> 6.0--> 6.3--> 6.7--> 6.5--> 6.7  <130 as she is having low sugars  -actos with edema  -januvia caused stomach issues  -on glyburide  -off metformin  -a1c 3 months     Epigastric pain  Resolved  On pantoprazole     Hyperlipidemia: has had issues with statins  -controlled on crestor      Right thumb trapeziectomy 6/2020; seeing ortho  -voltaren gel as needed--did not work  -advised to see ortho  -cannot do po NSAIDs with allergy  -saw ortho and told to do PT only (not not finished)  -limited with meds and cannot do NSAIDs. Tramadol does not help  -states now fine     S/p rotator cuff repair: hurts with fall-advised to go to Dr. Harris     GERD: controlled on PPI     Itchy skin: send to derm, trial steroid     Eye issues: on eye drops per eye doc     f/u 3-4months with labs now     Health Maintenance  -Pap smear: >65  -Vaccinations: Pneumovax UTD, Declines shingrix. ADvised tdap. Flu UTD  -Mammogram: 6/24 normal  -Colonoscopy: 7/5/17--repeat 10 years (Rebecca Dominguez--got records)  -DEXA: 6/24-osteopenia

## 2024-10-23 ENCOUNTER — TELEPHONE (OUTPATIENT)
Dept: PRIMARY CARE | Facility: CLINIC | Age: 74
End: 2024-10-23
Payer: MEDICAID

## 2024-10-23 LAB
25(OH)D3 SERPL-MCNC: 87 NG/ML (ref 30–100)
CHOLEST SERPL-MCNC: 119 MG/DL (ref 0–199)
CHOLESTEROL/HDL RATIO: 3.1
HDLC SERPL-MCNC: 38 MG/DL
LDLC SERPL CALC-MCNC: 49 MG/DL
NON HDL CHOLESTEROL: 81 MG/DL (ref 0–149)
TRIGL SERPL-MCNC: 162 MG/DL (ref 0–149)
TSH SERPL-ACNC: 0.58 MIU/L (ref 0.44–3.98)
VIT B12 SERPL-MCNC: 1411 PG/ML (ref 211–911)
VLDL: 32 MG/DL (ref 0–40)

## 2024-10-23 NOTE — TELEPHONE ENCOUNTER
----- Message from Becca Faith sent at 10/23/2024  8:53 AM EDT -----  Let her know labs look great

## 2024-11-18 DIAGNOSIS — R05.3 CHRONIC COUGH: ICD-10-CM

## 2024-11-18 DIAGNOSIS — L30.9 DERMATITIS: ICD-10-CM

## 2024-11-19 RX ORDER — ALBUTEROL SULFATE 90 UG/1
1-2 INHALANT RESPIRATORY (INHALATION) EVERY 6 HOURS PRN
Qty: 18 G | Refills: 0 | Status: SHIPPED | OUTPATIENT
Start: 2024-11-19 | End: 2025-11-19

## 2024-11-19 RX ORDER — TRIAMCINOLONE ACETONIDE 1 MG/G
CREAM TOPICAL 2 TIMES DAILY
Qty: 15 G | Refills: 0 | Status: SHIPPED | OUTPATIENT
Start: 2024-11-19

## 2024-11-22 DIAGNOSIS — L30.9 DERMATITIS: ICD-10-CM

## 2024-11-25 RX ORDER — TRIAMCINOLONE ACETONIDE 1 MG/G
CREAM TOPICAL 2 TIMES DAILY
Qty: 15 G | Refills: 0 | Status: SHIPPED | OUTPATIENT
Start: 2024-11-25

## 2024-12-11 DIAGNOSIS — L30.9 DERMATITIS: ICD-10-CM

## 2024-12-11 DIAGNOSIS — R05.3 CHRONIC COUGH: ICD-10-CM

## 2024-12-11 DIAGNOSIS — E11.65 TYPE 2 DIABETES MELLITUS WITH HYPERGLYCEMIA, WITHOUT LONG-TERM CURRENT USE OF INSULIN: Chronic | ICD-10-CM

## 2024-12-11 DIAGNOSIS — R12 HEARTBURN: ICD-10-CM

## 2024-12-11 DIAGNOSIS — I10 BENIGN ESSENTIAL HTN: ICD-10-CM

## 2024-12-11 DIAGNOSIS — E55.9 VITAMIN D DEFICIENCY: ICD-10-CM

## 2024-12-12 RX ORDER — PANTOPRAZOLE SODIUM 40 MG/1
40 TABLET, DELAYED RELEASE ORAL 2 TIMES DAILY
Qty: 180 TABLET | Refills: 3 | Status: SHIPPED | OUTPATIENT
Start: 2024-12-12 | End: 2025-12-07

## 2024-12-12 RX ORDER — ERGOCALCIFEROL 1.25 MG/1
50000 CAPSULE ORAL
Qty: 12 CAPSULE | Refills: 2 | Status: SHIPPED | OUTPATIENT
Start: 2024-12-15 | End: 2025-08-24

## 2024-12-12 RX ORDER — TRIAMCINOLONE ACETONIDE 1 MG/G
CREAM TOPICAL 2 TIMES DAILY
Qty: 15 G | Refills: 0 | Status: SHIPPED | OUTPATIENT
Start: 2024-12-12

## 2024-12-12 RX ORDER — BLOOD SUGAR DIAGNOSTIC
STRIP MISCELLANEOUS
Qty: 100 STRIP | Refills: 11 | Status: SHIPPED | OUTPATIENT
Start: 2024-12-12

## 2024-12-12 RX ORDER — ALBUTEROL SULFATE 90 UG/1
1-2 INHALANT RESPIRATORY (INHALATION) EVERY 6 HOURS PRN
Qty: 18 G | Refills: 0 | Status: SHIPPED | OUTPATIENT
Start: 2024-12-12 | End: 2025-12-12

## 2024-12-12 RX ORDER — AMLODIPINE BESYLATE 5 MG/1
5 TABLET ORAL DAILY
Qty: 90 TABLET | Refills: 1 | Status: SHIPPED | OUTPATIENT
Start: 2024-12-12 | End: 2025-12-07

## 2025-01-10 DIAGNOSIS — R05.3 CHRONIC COUGH: ICD-10-CM

## 2025-01-10 DIAGNOSIS — L30.9 DERMATITIS: ICD-10-CM

## 2025-01-10 RX ORDER — TRIAMCINOLONE ACETONIDE 1 MG/G
CREAM TOPICAL 2 TIMES DAILY
Qty: 15 G | Refills: 0 | Status: SHIPPED | OUTPATIENT
Start: 2025-01-10

## 2025-01-10 RX ORDER — ALBUTEROL SULFATE 90 UG/1
1-2 INHALANT RESPIRATORY (INHALATION) EVERY 6 HOURS PRN
Qty: 18 G | Refills: 0 | Status: SHIPPED | OUTPATIENT
Start: 2025-01-10 | End: 2026-01-10

## 2025-02-05 DIAGNOSIS — E53.8 B12 DEFICIENCY: ICD-10-CM

## 2025-02-05 DIAGNOSIS — E78.5 HYPERLIPIDEMIA, MILD: ICD-10-CM

## 2025-02-05 DIAGNOSIS — R05.3 CHRONIC COUGH: ICD-10-CM

## 2025-02-05 DIAGNOSIS — E11.9 TYPE 2 DIABETES MELLITUS WITHOUT COMPLICATION, UNSPECIFIED WHETHER LONG TERM INSULIN USE (MULTI): ICD-10-CM

## 2025-02-05 DIAGNOSIS — L30.9 DERMATITIS: ICD-10-CM

## 2025-02-05 RX ORDER — ROSUVASTATIN CALCIUM 20 MG/1
20 TABLET, COATED ORAL DAILY
Qty: 30 TABLET | Refills: 2 | Status: SHIPPED | OUTPATIENT
Start: 2025-02-05

## 2025-02-05 RX ORDER — ALBUTEROL SULFATE 90 UG/1
1-2 INHALANT RESPIRATORY (INHALATION) EVERY 6 HOURS PRN
Qty: 18 G | Refills: 0 | Status: SHIPPED | OUTPATIENT
Start: 2025-02-05 | End: 2026-02-05

## 2025-02-05 RX ORDER — GLYBURIDE 2.5 MG/1
5 TABLET ORAL DAILY
Qty: 120 TABLET | Refills: 1 | Status: SHIPPED | OUTPATIENT
Start: 2025-02-05

## 2025-02-05 RX ORDER — CHOLECALCIFEROL (VITAMIN D3) 50 MCG
TABLET ORAL
Qty: 60 TABLET | Refills: 2 | Status: SHIPPED | OUTPATIENT
Start: 2025-02-05

## 2025-02-05 RX ORDER — TRIAMCINOLONE ACETONIDE 1 MG/G
CREAM TOPICAL 2 TIMES DAILY
Qty: 15 G | Refills: 0 | Status: SHIPPED | OUTPATIENT
Start: 2025-02-05

## 2025-02-24 ENCOUNTER — APPOINTMENT (OUTPATIENT)
Dept: PRIMARY CARE | Facility: CLINIC | Age: 75
End: 2025-02-24
Payer: MEDICAID

## 2025-02-26 ENCOUNTER — APPOINTMENT (OUTPATIENT)
Dept: PRIMARY CARE | Facility: CLINIC | Age: 75
End: 2025-02-26
Payer: MEDICAID

## 2025-02-26 VITALS
HEART RATE: 67 BPM | OXYGEN SATURATION: 97 % | BODY MASS INDEX: 29.81 KG/M2 | WEIGHT: 174.6 LBS | HEIGHT: 64 IN | DIASTOLIC BLOOD PRESSURE: 60 MMHG | SYSTOLIC BLOOD PRESSURE: 128 MMHG

## 2025-02-26 DIAGNOSIS — R22.1 LUMP IN THROAT: ICD-10-CM

## 2025-02-26 DIAGNOSIS — I10 BENIGN ESSENTIAL HYPERTENSION: Chronic | ICD-10-CM

## 2025-02-26 DIAGNOSIS — G89.29 CHRONIC PAIN OF LEFT KNEE: ICD-10-CM

## 2025-02-26 DIAGNOSIS — E11.65 TYPE 2 DIABETES MELLITUS WITH HYPERGLYCEMIA, WITHOUT LONG-TERM CURRENT USE OF INSULIN: Primary | ICD-10-CM

## 2025-02-26 DIAGNOSIS — Z00.00 ROUTINE HEALTH MAINTENANCE: ICD-10-CM

## 2025-02-26 DIAGNOSIS — I10 BENIGN ESSENTIAL HTN: ICD-10-CM

## 2025-02-26 DIAGNOSIS — E11.9 TYPE 2 DIABETES MELLITUS WITHOUT COMPLICATION, UNSPECIFIED WHETHER LONG TERM INSULIN USE (MULTI): ICD-10-CM

## 2025-02-26 DIAGNOSIS — E53.8 B12 DEFICIENCY: ICD-10-CM

## 2025-02-26 DIAGNOSIS — M25.562 CHRONIC PAIN OF LEFT KNEE: ICD-10-CM

## 2025-02-26 DIAGNOSIS — E78.5 HYPERLIPIDEMIA, MILD: Chronic | ICD-10-CM

## 2025-02-26 LAB — POC HEMOGLOBIN A1C: 7.3 % (ref 4.2–6.5)

## 2025-02-26 PROCEDURE — 3074F SYST BP LT 130 MM HG: CPT | Performed by: INTERNAL MEDICINE

## 2025-02-26 PROCEDURE — 1036F TOBACCO NON-USER: CPT | Performed by: INTERNAL MEDICINE

## 2025-02-26 PROCEDURE — 1160F RVW MEDS BY RX/DR IN RCRD: CPT | Performed by: INTERNAL MEDICINE

## 2025-02-26 PROCEDURE — 1159F MED LIST DOCD IN RCRD: CPT | Performed by: INTERNAL MEDICINE

## 2025-02-26 PROCEDURE — 90471 IMMUNIZATION ADMIN: CPT | Performed by: INTERNAL MEDICINE

## 2025-02-26 PROCEDURE — 99214 OFFICE O/P EST MOD 30 MIN: CPT | Performed by: INTERNAL MEDICINE

## 2025-02-26 PROCEDURE — 90656 IIV3 VACC NO PRSV 0.5 ML IM: CPT | Performed by: INTERNAL MEDICINE

## 2025-02-26 PROCEDURE — 83036 HEMOGLOBIN GLYCOSYLATED A1C: CPT | Performed by: INTERNAL MEDICINE

## 2025-02-26 PROCEDURE — 3078F DIAST BP <80 MM HG: CPT | Performed by: INTERNAL MEDICINE

## 2025-02-26 RX ORDER — BLOOD-GLUCOSE CONTROL, NORMAL
EACH MISCELLANEOUS
Qty: 100 EACH | Refills: 3 | Status: SHIPPED | OUTPATIENT
Start: 2025-02-26

## 2025-02-26 RX ORDER — ROSUVASTATIN CALCIUM 20 MG/1
20 TABLET, COATED ORAL DAILY
Qty: 90 TABLET | Refills: 2 | Status: SHIPPED | OUTPATIENT
Start: 2025-02-26

## 2025-02-26 RX ORDER — AMLODIPINE BESYLATE 5 MG/1
5 TABLET ORAL DAILY
Qty: 90 TABLET | Refills: 1 | Status: SHIPPED | OUTPATIENT
Start: 2025-02-26 | End: 2026-02-21

## 2025-02-26 RX ORDER — VIT C/E/ZN/COPPR/LUTEIN/ZEAXAN 250MG-90MG
2 CAPSULE ORAL DAILY
Qty: 180 TABLET | Refills: 2 | Status: SHIPPED | OUTPATIENT
Start: 2025-02-26

## 2025-02-26 RX ORDER — GLYBURIDE 2.5 MG/1
5 TABLET ORAL
Qty: 180 TABLET | Refills: 1 | Status: SHIPPED | OUTPATIENT
Start: 2025-02-26

## 2025-02-26 RX ORDER — BLOOD SUGAR DIAGNOSTIC
STRIP MISCELLANEOUS
Qty: 100 STRIP | Refills: 11 | Status: SHIPPED | OUTPATIENT
Start: 2025-02-26

## 2025-02-26 ASSESSMENT — ENCOUNTER SYMPTOMS
ABDOMINAL PAIN: 0
SHORTNESS OF BREATH: 0
CHEST TIGHTNESS: 0
COUGH: 0
MYALGIAS: 0
DIARRHEA: 0
DYSURIA: 0
SORE THROAT: 0
POLYPHAGIA: 0
FEVER: 0
FREQUENCY: 0
WOUND: 0
CHILLS: 0
BLOOD IN STOOL: 0
POLYDIPSIA: 0
CONSTIPATION: 0
RHINORRHEA: 0
HEMATURIA: 0
VOMITING: 0
UNEXPECTED WEIGHT CHANGE: 0
EYE PAIN: 0
HEADACHES: 0
PALPITATIONS: 0
WHEEZING: 0
NERVOUS/ANXIOUS: 0
NAUSEA: 0
DIZZINESS: 0
ARTHRALGIAS: 1
DYSPHORIC MOOD: 0

## 2025-02-26 NOTE — PROGRESS NOTES
"Subjective   Patient ID: Najibeh Tannous is a 74 y.o. female who presents for 4 month follow up .    HPI     Here for followup  Sugars have been 160-170s  More sweets  Travels to [place] soon    She feels well  Had her joint aches  Family friend did cortisone in right knee and did not helped. Advised her to get MRI. Did not order. Asks me to get. No notes of this--advised would need to be sent her    Wants flu shot. Out of high dose but does want regular dose with travel 3/12    She also states she has lump in back of throat. Feels like something is stuck    Review of Systems   Constitutional:  Negative for chills, fever and unexpected weight change.   HENT:  Negative for congestion, hearing loss, rhinorrhea and sore throat.    Eyes:  Negative for pain and visual disturbance.   Respiratory:  Negative for cough, chest tightness, shortness of breath and wheezing.    Cardiovascular:  Negative for chest pain and palpitations.   Gastrointestinal:  Negative for abdominal pain, blood in stool, constipation, diarrhea, nausea and vomiting.   Endocrine: Negative for cold intolerance, heat intolerance, polydipsia and polyphagia.   Genitourinary:  Negative for dysuria, frequency and hematuria.   Musculoskeletal:  Positive for arthralgias. Negative for myalgias.   Skin:  Negative for rash and wound.   Neurological:  Negative for dizziness, syncope and headaches.   Psychiatric/Behavioral:  Negative for dysphoric mood. The patient is not nervous/anxious.        Objective   /60 (BP Location: Left arm, Patient Position: Sitting, BP Cuff Size: Adult)   Pulse 67   Ht 1.626 m (5' 4\")   Wt 79.2 kg (174 lb 9.6 oz)   SpO2 97%   BMI 29.97 kg/m²     Physical Exam  Constitutional:       Appearance: Normal appearance.   HENT:      Mouth/Throat:      Mouth: Mucous membranes are moist.      Pharynx: Oropharynx is clear. No oropharyngeal exudate or posterior oropharyngeal erythema.   Cardiovascular:      Rate and Rhythm: Normal rate " and regular rhythm.      Heart sounds: Normal heart sounds. No murmur heard.     No gallop.   Pulmonary:      Effort: Pulmonary effort is normal. No respiratory distress.      Breath sounds: Normal breath sounds.   Musculoskeletal:      Right lower leg: No edema.      Left lower leg: No edema.   Neurological:      Mental Status: She is alert.         Assessment/Plan   Problem List Items Addressed This Visit             ICD-10-CM    Benign essential hypertension (Chronic) I10    Hyperlipidemia, mild (Chronic) E78.5    Relevant Medications    rosuvastatin (Crestor) 20 mg tablet    Left knee pain M25.562    Type 2 diabetes mellitus with hyperglycemia, without long-term current use of insulin - Primary (Chronic) E11.65    Relevant Medications    blood sugar diagnostic (OneTouch Ultra Test) strip    lancets (Easy Comfort Lancets) 30 gauge misc    Other Relevant Orders    POCT glycosylated hemoglobin (Hb A1C) manually resulted (Completed)     Other Visit Diagnoses         Codes    Type 2 diabetes mellitus without complication, unspecified whether long term insulin use (Multi)     E11.9    Relevant Medications    glyBURIDE (Diabeta) 2.5 mg tablet    Benign essential HTN     I10    Relevant Medications    amLODIPine (Norvasc) 5 mg tablet    B12 deficiency     E53.8    Relevant Medications    cyanocobalamin, vitamin B-12, (Vitamin B-12) 2,500 mcg tablet, sublingual SL tablet    Lump in throat     R22.1    Relevant Orders    Referral to ENT    Routine health maintenance     Z00.00    Relevant Orders    Flu vaccine, trivalent, preservative free, age 6 months and greater (Fluarix/Fluzone/Flulaval) (Completed)        Lump in throat- send to ENT    Knee pain-discussed would need to get notes to see why neede  -may need to see orthopedics    Knee pain-send to PT  -declined orthopedics      Neuropathy  -improving with b12     B12 deficiency     Vitamin D deficiency-cont vitamin D      Hx of spinal stenosis s/p L4/L5 laminectomy  (8/19)  MRI spine 1/31/20: postsurgical changes, re-demonstrated lumbar spondylosis and grade 1 L4/L5 anterolitsthesis with moderate narrowing.     Hypertension: controlled on Norvasc     ELbow tendonitis: stop knitting, compression sleeve     Diabetes type 2: a1c=5.8--> 6.3--> 6.5--> 6.0--> 6.3--> 6.7--> 6.5--> 6.7--> 7.3  <130 as she is having low sugars  -actos with edema  -januvia caused stomach issues  -on glyburide-increase to 2 tabs in AM and 1 in PM. Given 2 tabs bid if needed when travels  -off metformin  -a1c 3 months     Epigastric pain  Resolved  On pantoprazole     Hyperlipidemia: has had issues with statins  -controlled on crestor      Right thumb trapeziectomy 6/2020; seeing ortho  -voltaren gel as needed--did not work  -advised to see ortho  -cannot do po NSAIDs with allergy  -saw ortho and told to do PT only (not not finished)  -limited with meds and cannot do NSAIDs. Tramadol does not help  -states now fine     S/p rotator cuff repair: hurts with fall-advised to go to Dr. Harris     GERD: controlled on PPI     Itchy skin: send to derm, trial steroid     Eye issues: on eye drops per eye doc     f/u 3-4months      Health Maintenance  -Pap smear: >65  -Vaccinations: Pneumovax UTD, Declines shingrix. ADvised tdap. Flu UTD  -Mammogram: 6/24 normal  -Colonoscopy: 7/5/17--repeat 10 years (Rebecca Dominguez--got records)  -DEXA: 6/24-osteopenia

## 2025-03-05 DIAGNOSIS — L30.9 DERMATITIS: ICD-10-CM

## 2025-03-05 DIAGNOSIS — R05.3 CHRONIC COUGH: ICD-10-CM

## 2025-03-06 RX ORDER — TRIAMCINOLONE ACETONIDE 1 MG/G
CREAM TOPICAL 2 TIMES DAILY
Qty: 15 G | Refills: 0 | Status: SHIPPED | OUTPATIENT
Start: 2025-03-06

## 2025-03-06 RX ORDER — ALBUTEROL SULFATE 90 UG/1
1-2 INHALANT RESPIRATORY (INHALATION) EVERY 6 HOURS PRN
Qty: 18 G | Refills: 0 | Status: SHIPPED | OUTPATIENT
Start: 2025-03-06 | End: 2026-03-06

## 2025-03-11 DIAGNOSIS — E11.9 TYPE 2 DIABETES MELLITUS WITHOUT COMPLICATION, UNSPECIFIED WHETHER LONG TERM INSULIN USE (MULTI): ICD-10-CM

## 2025-03-11 RX ORDER — GLYBURIDE 2.5 MG/1
5 TABLET ORAL
Qty: 180 TABLET | Refills: 1 | Status: SHIPPED | OUTPATIENT
Start: 2025-03-11

## 2025-03-11 NOTE — TELEPHONE ENCOUNTER
"Daughter in law called for pt. Pt stated that she needs a medication refill for her \"diabetes medication\". Pt is leaving town tomorrow, pt also does not know the name of the medication.     Pharmacy:  [facility] (daughter in law) [phone]  "

## 2025-03-31 ENCOUNTER — TELEPHONE (OUTPATIENT)
Dept: PRIMARY CARE | Facility: CLINIC | Age: 75
End: 2025-03-31
Payer: MEDICAID

## 2025-03-31 DIAGNOSIS — E11.65 TYPE 2 DIABETES MELLITUS WITH HYPERGLYCEMIA, WITHOUT LONG-TERM CURRENT USE OF INSULIN: Primary | ICD-10-CM

## 2025-03-31 NOTE — TELEPHONE ENCOUNTER
Pts daughter called in regards to her glucose levels even after starting her Glyburide tablet. Merna called in stating she is concerned due to moms levels being elevated still running at 150-170. Wondering what she should do? Please advise. Thank you!

## 2025-04-01 RX ORDER — METFORMIN HYDROCHLORIDE 500 MG/1
500 TABLET, EXTENDED RELEASE ORAL
Qty: 90 TABLET | Refills: 0 | Status: SHIPPED | OUTPATIENT
Start: 2025-04-01 | End: 2025-06-30

## 2025-04-01 NOTE — TELEPHONE ENCOUNTER
Tried calling patients daughter no answer went straight to voicemail unable to leave VM due to mail box being full.

## 2025-04-01 NOTE — TELEPHONE ENCOUNTER
Called and spoke with patients daughter aware to take both medications and to take metformin once daily.

## 2025-04-02 DIAGNOSIS — R05.3 CHRONIC COUGH: ICD-10-CM

## 2025-04-02 DIAGNOSIS — L30.9 DERMATITIS: ICD-10-CM

## 2025-04-02 RX ORDER — TRIAMCINOLONE ACETONIDE 1 MG/G
CREAM TOPICAL 2 TIMES DAILY
Qty: 15 G | Refills: 1 | Status: SHIPPED | OUTPATIENT
Start: 2025-04-02

## 2025-04-02 RX ORDER — ALBUTEROL SULFATE 90 UG/1
1-2 INHALANT RESPIRATORY (INHALATION) EVERY 6 HOURS PRN
Qty: 18 G | Refills: 1 | Status: SHIPPED | OUTPATIENT
Start: 2025-04-02 | End: 2026-04-02

## 2025-04-29 DIAGNOSIS — E11.9 TYPE 2 DIABETES MELLITUS WITHOUT COMPLICATION, UNSPECIFIED WHETHER LONG TERM INSULIN USE: ICD-10-CM

## 2025-04-29 DIAGNOSIS — E53.8 B12 DEFICIENCY: ICD-10-CM

## 2025-04-29 DIAGNOSIS — E78.5 HYPERLIPIDEMIA, MILD: Chronic | ICD-10-CM

## 2025-04-30 RX ORDER — CHOLECALCIFEROL (VITAMIN D3) 50 MCG
TABLET ORAL
Qty: 60 TABLET | Refills: 3 | Status: SHIPPED | OUTPATIENT
Start: 2025-04-30

## 2025-04-30 RX ORDER — GLYBURIDE 2.5 MG/1
5 TABLET ORAL
Qty: 180 TABLET | Refills: 2 | Status: SHIPPED | OUTPATIENT
Start: 2025-04-30

## 2025-04-30 RX ORDER — ROSUVASTATIN CALCIUM 20 MG/1
20 TABLET, COATED ORAL DAILY
Qty: 30 TABLET | Refills: 3 | Status: SHIPPED | OUTPATIENT
Start: 2025-04-30

## 2025-05-27 ENCOUNTER — APPOINTMENT (OUTPATIENT)
Dept: PRIMARY CARE | Facility: CLINIC | Age: 75
End: 2025-05-27
Payer: MEDICAID

## 2025-05-27 VITALS
HEART RATE: 58 BPM | RESPIRATION RATE: 14 BRPM | WEIGHT: 172.4 LBS | SYSTOLIC BLOOD PRESSURE: 146 MMHG | DIASTOLIC BLOOD PRESSURE: 70 MMHG | TEMPERATURE: 98.4 F | HEIGHT: 63 IN | OXYGEN SATURATION: 97 % | BODY MASS INDEX: 30.55 KG/M2

## 2025-05-27 DIAGNOSIS — E11.65 TYPE 2 DIABETES MELLITUS WITH HYPERGLYCEMIA, WITHOUT LONG-TERM CURRENT USE OF INSULIN: ICD-10-CM

## 2025-05-27 DIAGNOSIS — R09.82 POST-NASAL DRIP: Primary | ICD-10-CM

## 2025-05-27 DIAGNOSIS — L30.9 DERMATITIS: ICD-10-CM

## 2025-05-27 DIAGNOSIS — N30.00 ACUTE CYSTITIS WITHOUT HEMATURIA: ICD-10-CM

## 2025-05-27 DIAGNOSIS — H57.9 ITCHY EYES: ICD-10-CM

## 2025-05-27 DIAGNOSIS — B35.4 TINEA CORPORIS: ICD-10-CM

## 2025-05-27 LAB
POC APPEARANCE, URINE: CLEAR
POC BILIRUBIN, URINE: NEGATIVE
POC BLOOD, URINE: NEGATIVE
POC COLOR, URINE: ABNORMAL
POC GLUCOSE, URINE: NEGATIVE MG/DL
POC KETONES, URINE: NEGATIVE MG/DL
POC LEUKOCYTES, URINE: ABNORMAL
POC NITRITE,URINE: NEGATIVE
POC PH, URINE: 6 PH
POC PROTEIN, URINE: ABNORMAL MG/DL
POC SPECIFIC GRAVITY, URINE: 1.02
POC UROBILINOGEN, URINE: 0.2 EU/DL

## 2025-05-27 PROCEDURE — 3078F DIAST BP <80 MM HG: CPT | Performed by: STUDENT IN AN ORGANIZED HEALTH CARE EDUCATION/TRAINING PROGRAM

## 2025-05-27 PROCEDURE — 3051F HG A1C>EQUAL 7.0%<8.0%: CPT | Performed by: STUDENT IN AN ORGANIZED HEALTH CARE EDUCATION/TRAINING PROGRAM

## 2025-05-27 PROCEDURE — 1125F AMNT PAIN NOTED PAIN PRSNT: CPT | Performed by: STUDENT IN AN ORGANIZED HEALTH CARE EDUCATION/TRAINING PROGRAM

## 2025-05-27 PROCEDURE — 3077F SYST BP >= 140 MM HG: CPT | Performed by: STUDENT IN AN ORGANIZED HEALTH CARE EDUCATION/TRAINING PROGRAM

## 2025-05-27 PROCEDURE — 1036F TOBACCO NON-USER: CPT | Performed by: STUDENT IN AN ORGANIZED HEALTH CARE EDUCATION/TRAINING PROGRAM

## 2025-05-27 PROCEDURE — 81003 URINALYSIS AUTO W/O SCOPE: CPT | Performed by: STUDENT IN AN ORGANIZED HEALTH CARE EDUCATION/TRAINING PROGRAM

## 2025-05-27 PROCEDURE — 1159F MED LIST DOCD IN RCRD: CPT | Performed by: STUDENT IN AN ORGANIZED HEALTH CARE EDUCATION/TRAINING PROGRAM

## 2025-05-27 PROCEDURE — 1160F RVW MEDS BY RX/DR IN RCRD: CPT | Performed by: STUDENT IN AN ORGANIZED HEALTH CARE EDUCATION/TRAINING PROGRAM

## 2025-05-27 PROCEDURE — 99214 OFFICE O/P EST MOD 30 MIN: CPT | Performed by: STUDENT IN AN ORGANIZED HEALTH CARE EDUCATION/TRAINING PROGRAM

## 2025-05-27 RX ORDER — NYSTATIN 100000 [USP'U]/G
1 POWDER TOPICAL 3 TIMES DAILY
Qty: 60 G | Refills: 0 | Status: SHIPPED | OUTPATIENT
Start: 2025-05-27 | End: 2025-05-30 | Stop reason: SDUPTHER

## 2025-05-27 RX ORDER — NITROFURANTOIN 25; 75 MG/1; MG/1
100 CAPSULE ORAL 2 TIMES DAILY
Qty: 10 CAPSULE | Refills: 0 | Status: SHIPPED | OUTPATIENT
Start: 2025-05-27 | End: 2025-06-01

## 2025-05-27 RX ORDER — TRIAMCINOLONE ACETONIDE 1 MG/G
CREAM TOPICAL 2 TIMES DAILY
Qty: 15 G | Refills: 1 | Status: SHIPPED | OUTPATIENT
Start: 2025-05-27 | End: 2025-05-27

## 2025-05-27 RX ORDER — TRIAMCINOLONE ACETONIDE 1 MG/G
CREAM TOPICAL 2 TIMES DAILY
Qty: 15 G | Refills: 1 | Status: SHIPPED | OUTPATIENT
Start: 2025-05-27

## 2025-05-27 RX ORDER — FLUTICASONE PROPIONATE 50 MCG
1-2 SPRAY, SUSPENSION (ML) NASAL DAILY
Qty: 16 G | Refills: 0 | Status: SHIPPED | OUTPATIENT
Start: 2025-05-27 | End: 2025-05-30 | Stop reason: SDUPTHER

## 2025-05-27 ASSESSMENT — PATIENT HEALTH QUESTIONNAIRE - PHQ9
SUM OF ALL RESPONSES TO PHQ9 QUESTIONS 1 AND 2: 0
2. FEELING DOWN, DEPRESSED OR HOPELESS: NOT AT ALL
SUM OF ALL RESPONSES TO PHQ9 QUESTIONS 1 AND 2: 0
1. LITTLE INTEREST OR PLEASURE IN DOING THINGS: NOT AT ALL
2. FEELING DOWN, DEPRESSED OR HOPELESS: NOT AT ALL
1. LITTLE INTEREST OR PLEASURE IN DOING THINGS: NOT AT ALL

## 2025-05-27 ASSESSMENT — ENCOUNTER SYMPTOMS
DIAPHORESIS: 0
NAUSEA: 0
FEVER: 0
RHINORRHEA: 0
DIARRHEA: 1
ROS GI COMMENTS: NO MELENA
SHORTNESS OF BREATH: 0
BLOOD IN STOOL: 0
VOMITING: 0
CHILLS: 0

## 2025-05-27 ASSESSMENT — PAIN SCALES - GENERAL: PAINLEVEL_OUTOF10: 8

## 2025-05-27 NOTE — PROGRESS NOTES
"Subjective   Patient ID: Najibeh Tannous is a 74 y.o. female who presents for Pain (And itching all over body x 1 month ).  History of Present Illness  The patient is a 74-year-old female presenting with facial pruritus and urinary symptoms.    The patient was referred to dermatology in February 2025 for pruritic skin and was prescribed a topical steroid. She has had pruritus since returning from [place] one month ago, localized to the face, upper back, chest, and abdomen. There is no pruritus on her back. Papules on her chest at medial breast folds worsen with heat. She has not used any new makeup, soaps, shampoos, creams, or clothing, and has had no contact with sick individuals. She has not tried steroid cream yet. She is not taking Allegra.    The patient has experienced intermittent diarrhea since returning from [place]. There is no hematochezia or melena. It is intermittent.     The patient is experiencing dysuria over the alst 2-3 days. No flank pain.     She has postnasal drip at night, necessitating expectoration. She is not using Zyrtec or Claritin but is using allegra for allergies.    She has been feeling sad and upset for the past few weeks, with no suicidal ideation or intent to harm others.    Review of Systems   Constitutional:  Negative for chills, diaphoresis and fever.   HENT:  Positive for postnasal drip. Negative for rhinorrhea.    Respiratory:  Negative for shortness of breath.    Cardiovascular:  Negative for chest pain.   Gastrointestinal:  Positive for diarrhea (intermittent). Negative for blood in stool, nausea and vomiting.        No melena         Objective     /70 (BP Location: Left arm, Patient Position: Sitting, BP Cuff Size: Adult)   Pulse 58   Temp 36.9 °C (98.4 °F) (Skin)   Resp 14   Ht 1.6 m (5' 3\")   Wt 78.2 kg (172 lb 6.4 oz)   SpO2 97%   BMI 30.54 kg/m²      Physical Exam  Nel CNP present during exam.   Respiratory: Clear to auscultation, no wheezing, rales, or " rhonchi  Cardiovascular: Regular rate and rhythm, no murmurs, rubs, or gallops  Gastrointestinal: Soft, nontender, nondistended. Bowel sounds present in all quadrants. No suprapubic tenderness.  Skin: Dry, irritated with bumps on left medial breast region. No visible rash present on face, upper back, chest, and abdomen  Other: No CVA tenderness bilaterally    Assessment & Plan  Pruritus  - Etiology could be multifactorial, involving allergies or dry skin  - Prescribed Flonase nasal spray, 1 spray each nostril daily; discontinue if epistaxis occurs  - Prescribed steroid cream for back, use for up to 2 weeks; discontinue if ineffective. Do not use on face, fingers, or genitals.   - Referred to dermatologist  - Prescribed nystatin powder for breast area    Urinary Tract Infection  - Urine sample to be collected for analysis  - Exam: no suprapubic tenderness, no CVA tenderness bilaterally  - Prescribed Macrobid 100 mg, twice daily for 5 days  - Seek immediate medical attention if symptoms worsen with fever, chills, sweats, flank pain.     Allergic rhinitis  - Symptoms: itchy eyes, postnasal drip  - Prescribed Flonase nasal spray, 1-2 sprays in each nostril daily; discontinue if epistaxis occurs  - Consult eye doctor, referred    Health maintenance  - A1c test to monitor blood sugar levels  - Follow-up visit in 4 to 6 weeks to discuss mood and ongoing issues    Mood disorder  - Feeling sad and down since returning from [place] a month ago  - No thoughts of self-harm or harm to others  - Advised to schedule appointment with Dr. Faith or myself to discuss mood symptoms as bit of nuance going through different meds.       Results         Tanvir Fierro DO     This medical note was created with the assistance of artificial intelligence (AI) for documentation purposes. The content has been reviewed and confirmed by the healthcare provider for accuracy and completeness. Patient consented to the use of audio recording and use of  AI during their visit.

## 2025-05-29 LAB
BACTERIA #/AREA URNS HPF: ABNORMAL /HPF
BACTERIA UR CULT: NORMAL
HYALINE CASTS #/AREA URNS LPF: ABNORMAL /LPF
RBC #/AREA URNS HPF: ABNORMAL /HPF
SERVICE CMNT-IMP: ABNORMAL
SQUAMOUS #/AREA URNS HPF: ABNORMAL /HPF
WBC #/AREA URNS HPF: ABNORMAL /HPF

## 2025-05-30 DIAGNOSIS — R05.3 CHRONIC COUGH: ICD-10-CM

## 2025-05-30 DIAGNOSIS — B35.4 TINEA CORPORIS: ICD-10-CM

## 2025-05-30 DIAGNOSIS — N30.00 ACUTE CYSTITIS WITHOUT HEMATURIA: ICD-10-CM

## 2025-05-30 DIAGNOSIS — E11.65 TYPE 2 DIABETES MELLITUS WITH HYPERGLYCEMIA, WITHOUT LONG-TERM CURRENT USE OF INSULIN: ICD-10-CM

## 2025-05-30 DIAGNOSIS — R09.82 POST-NASAL DRIP: ICD-10-CM

## 2025-05-30 RX ORDER — NITROFURANTOIN 25; 75 MG/1; MG/1
100 CAPSULE ORAL 2 TIMES DAILY
Qty: 10 CAPSULE | Refills: 0 | OUTPATIENT
Start: 2025-05-30 | End: 2025-06-04

## 2025-05-30 RX ORDER — FLUTICASONE PROPIONATE 50 MCG
1-2 SPRAY, SUSPENSION (ML) NASAL DAILY
Qty: 16 G | Refills: 0 | Status: SHIPPED | OUTPATIENT
Start: 2025-05-30 | End: 2026-05-30

## 2025-05-30 RX ORDER — BLOOD-GLUCOSE CONTROL, NORMAL
EACH MISCELLANEOUS
Qty: 200 EACH | Refills: 3 | Status: SHIPPED | OUTPATIENT
Start: 2025-05-30

## 2025-05-30 RX ORDER — METFORMIN HYDROCHLORIDE 500 MG/1
500 TABLET, EXTENDED RELEASE ORAL
Qty: 90 TABLET | Refills: 0 | Status: SHIPPED | OUTPATIENT
Start: 2025-05-30

## 2025-05-30 RX ORDER — NYSTATIN 100000 U/G
CREAM TOPICAL
Qty: 60 G | Refills: 0 | Status: SHIPPED | OUTPATIENT
Start: 2025-05-30

## 2025-05-30 RX ORDER — ALBUTEROL SULFATE 90 UG/1
1-2 INHALANT RESPIRATORY (INHALATION) EVERY 6 HOURS PRN
Qty: 18 G | Refills: 1 | Status: SHIPPED | OUTPATIENT
Start: 2025-05-30 | End: 2026-05-30

## 2025-05-30 RX ORDER — CETIRIZINE HYDROCHLORIDE 10 MG/1
10 TABLET ORAL DAILY
Qty: 90 TABLET | Refills: 1 | Status: SHIPPED | OUTPATIENT
Start: 2025-05-30 | End: 2025-11-26

## 2025-06-04 DIAGNOSIS — M25.562 CHRONIC PAIN OF LEFT KNEE: Primary | ICD-10-CM

## 2025-06-04 DIAGNOSIS — G89.29 CHRONIC PAIN OF LEFT KNEE: Primary | ICD-10-CM

## 2025-06-05 RX ORDER — LIDOCAINE 50 MG/G
PATCH TOPICAL
Qty: 30 PATCH | Refills: 3 | Status: SHIPPED | OUTPATIENT
Start: 2025-06-05

## 2025-06-25 DIAGNOSIS — E11.65 TYPE 2 DIABETES MELLITUS WITH HYPERGLYCEMIA, WITHOUT LONG-TERM CURRENT USE OF INSULIN: ICD-10-CM

## 2025-06-25 DIAGNOSIS — R09.82 POST-NASAL DRIP: ICD-10-CM

## 2025-06-25 DIAGNOSIS — B35.4 TINEA CORPORIS: ICD-10-CM

## 2025-06-25 RX ORDER — NYSTATIN 100000 U/G
CREAM TOPICAL
Qty: 60 G | Refills: 0 | Status: SHIPPED | OUTPATIENT
Start: 2025-06-25

## 2025-06-25 RX ORDER — FLUTICASONE PROPIONATE 50 MCG
1-2 SPRAY, SUSPENSION (ML) NASAL DAILY
Qty: 16 G | Refills: 0 | Status: SHIPPED | OUTPATIENT
Start: 2025-06-25 | End: 2026-06-25

## 2025-06-26 RX ORDER — METFORMIN HYDROCHLORIDE 500 MG/1
500 TABLET, EXTENDED RELEASE ORAL
Qty: 90 TABLET | Refills: 0 | Status: SHIPPED | OUTPATIENT
Start: 2025-06-26

## 2025-07-01 ENCOUNTER — APPOINTMENT (OUTPATIENT)
Dept: RADIOLOGY | Facility: CLINIC | Age: 75
End: 2025-07-01
Payer: MEDICAID

## 2025-07-01 ENCOUNTER — APPOINTMENT (OUTPATIENT)
Dept: PRIMARY CARE | Facility: CLINIC | Age: 75
End: 2025-07-01
Payer: MEDICAID

## 2025-07-01 ENCOUNTER — LAB REQUISITION (OUTPATIENT)
Dept: LAB | Facility: HOSPITAL | Age: 75
End: 2025-07-01

## 2025-07-01 VITALS
RESPIRATION RATE: 18 BRPM | BODY MASS INDEX: 31.04 KG/M2 | WEIGHT: 175.2 LBS | TEMPERATURE: 97.7 F | HEIGHT: 63 IN | HEART RATE: 59 BPM | OXYGEN SATURATION: 98 % | DIASTOLIC BLOOD PRESSURE: 69 MMHG | SYSTOLIC BLOOD PRESSURE: 154 MMHG

## 2025-07-01 DIAGNOSIS — H53.9 VISION CHANGES: ICD-10-CM

## 2025-07-01 DIAGNOSIS — R19.8 OTHER SPECIFIED SYMPTOMS AND SIGNS INVOLVING THE DIGESTIVE SYSTEM AND ABDOMEN: ICD-10-CM

## 2025-07-01 DIAGNOSIS — R19.8 ABDOMINAL GUARDING: ICD-10-CM

## 2025-07-01 DIAGNOSIS — E11.65 TYPE 2 DIABETES MELLITUS WITH HYPERGLYCEMIA, WITHOUT LONG-TERM CURRENT USE OF INSULIN: ICD-10-CM

## 2025-07-01 DIAGNOSIS — Z00.00 HEALTHCARE MAINTENANCE: Primary | ICD-10-CM

## 2025-07-01 DIAGNOSIS — L98.9 FACE LESION: ICD-10-CM

## 2025-07-01 LAB
ERYTHROCYTE [DISTWIDTH] IN BLOOD BY AUTOMATED COUNT: 14.9 % (ref 11.5–14.5)
HCT VFR BLD AUTO: 41.6 % (ref 36–46)
HGB BLD-MCNC: 13 G/DL (ref 12–16)
LIPASE SERPL-CCNC: 23 U/L (ref 9–82)
MCH RBC QN AUTO: 25.2 PG (ref 26–34)
MCHC RBC AUTO-ENTMCNC: 31.3 G/DL (ref 32–36)
MCV RBC AUTO: 81 FL (ref 80–100)
NRBC BLD-RTO: 0 /100 WBCS (ref 0–0)
PLATELET # BLD AUTO: 147 X10*3/UL (ref 150–450)
POC HEMOGLOBIN A1C: 7.4 % (ref 4.2–6.5)
RBC # BLD AUTO: 5.16 X10*6/UL (ref 4–5.2)
WBC # BLD AUTO: 7.1 X10*3/UL (ref 4.4–11.3)

## 2025-07-01 PROCEDURE — 83690 ASSAY OF LIPASE: CPT

## 2025-07-01 PROCEDURE — 85027 COMPLETE CBC AUTOMATED: CPT

## 2025-07-01 ASSESSMENT — ENCOUNTER SYMPTOMS
ABDOMINAL PAIN: 0
BLOOD IN STOOL: 0
VOMITING: 0
FEVER: 0
OCCASIONAL FEELINGS OF UNSTEADINESS: 0
DIARRHEA: 0
NAUSEA: 1
ROS GI COMMENTS: NO MELENA
CONSTIPATION: 0
DEPRESSION: 0
UNEXPECTED WEIGHT CHANGE: 0
CHILLS: 0
SHORTNESS OF BREATH: 0
DIAPHORESIS: 0
LOSS OF SENSATION IN FEET: 0

## 2025-07-01 ASSESSMENT — PATIENT HEALTH QUESTIONNAIRE - PHQ9
2. FEELING DOWN, DEPRESSED OR HOPELESS: NOT AT ALL
SUM OF ALL RESPONSES TO PHQ9 QUESTIONS 1 AND 2: 0
1. LITTLE INTEREST OR PLEASURE IN DOING THINGS: NOT AT ALL

## 2025-07-01 ASSESSMENT — PAIN SCALES - GENERAL: PAINLEVEL_OUTOF10: 8

## 2025-07-01 NOTE — PATIENT INSTRUCTIONS
Please schedule an appointment with your GI doctor ([facility]) by calling.  Please call the number highlighted to schedule your eye doctor and dermatology appointment.  Our pharmacy team will call you to help control the diabetes.  We will schedule you for a CT scan in the near future.  Please schedule labs by going to [email or web address].  Please go to the emergency room if the abdominal pain gets worse or you get fevers, chills, sweats, worsening vision changes, worsening symptoms.

## 2025-07-01 NOTE — PROGRESS NOTES
"Subjective   Patient ID: Najibeh Tannous is a 74 y.o. female who presents for Follow-up (4-6 week follow up) and Nausea (With abdominal pain x 1 month ).  History of Present Illness  A 74-year-old female with a history of GERD, controlled on PPI, presents for follow-up.    She reports persistent nausea and abdominal pain, unchanged with pantoprazole. Her symptoms include bloating, nausea postprandial, a heavy stomach feeling, and difficulty burping. She denies vomiting, fevers, chills, sweats, weight changes, shortness of breath, chest pain, diarrhea, constipation, or blood in stool. An endoscopy in March 2024 showed inflammation, and an emptying study was recommended. She has a history of hiatal hernia.    Her blood sugar fluctuates. She is on glyburide and metformin, though the latter has been ineffective. She has been on metformin since January 2025. No hypoglycemic episodes.     Has 1cm firm area on right temple for last 1-2 days and itchy.    Her mood has improved, and she does not desire medication.    She is experiencing itchiness, and lotion has been ineffective, leading her to discontinue its use.    She has discontinued Zyrtec for allergies due to sleepiness.    She reports subjective eyelid dryness/stickiness and has an eye doctor appointment scheduled.    Review of Systems   Constitutional:  Negative for chills, diaphoresis, fever and unexpected weight change.   Respiratory:  Negative for shortness of breath.    Cardiovascular:  Negative for chest pain.   Gastrointestinal:  Positive for nausea. Negative for abdominal pain, blood in stool, constipation, diarrhea and vomiting.        No melena         Objective     /69 (BP Location: Left arm, Patient Position: Sitting, BP Cuff Size: Adult)   Pulse 59   Temp 36.5 °C (97.7 °F) (Skin)   Resp 18   Ht 1.6 m (5' 3\")   Wt 79.5 kg (175 lb 3.2 oz)   SpO2 98%   BMI 31.04 kg/m²      Physical Exam    GENERAL: Alert, cooperative, well developed, no acute " distress, no diaphoresis.  HEAD: Conjunctiva normal, no erythema or edema present around eyes whatsoever. EOMI.   CHEST: Clear to auscultation bilaterally, no wheezes, rhonchi, or rales.  CARDIOVASCULAR: Normal heart rate and rhythm, S1 and S2 normal without murmurs, rubs, or gallops.  ABDOMEN: Guarding in epigastric, RUQ, and LUQ. No tenderness in RLQ and LLQ. Bowel sounds present in all quadrants.  EXTREMITIES: No cyanosis or edema.  NEUROLOGICAL: Moves all extremities without gross motor deficit.  PSYCH: Mood and affect normal.       Assessment & Plan  Abdominal pain:  - Guarding in epigastric, RUQ, and LUQ; no tenderness in RLQ and LLQ; bowel sounds present  - Blood work and CAT scan ordered stat to r/o emergent pathologies  - Schedule blood test before CAT scan; advised she call gastroenterologist for evaluation  -ER fore worsening symptoms, fever, chills, sweats.     Diabetes mellitus:  - A1c 7.4, similar to 3 months ago  - On glyburide and metformin;  - Referral to pharmacy team for medication review and adjustment  - Monitor blood sugar at home, report readings  - F/u Dr. Faith    Facial lesion:  - Likely bug bite  - Monitor lesion; seek medical attention if worsens    Mood disorder:  - Improved mood; no medication desired    Pruritus:  - Itchiness; lotion ineffective, discontinued  - F/u Dr. Faith    Allergies:  - Zyrtec causes sleepiness, discontinued  - F/u Dr. Faith    Eye symptoms  -No signs of any infection on exam  -Prompt eye doctor appt advised    F/u Dr. Faith [facility] 8/25, sooner prn      Results         Tanvir Fierro DO     This medical note was created with the assistance of artificial intelligence (AI) for documentation purposes. The content has been reviewed and confirmed by the healthcare provider for accuracy and completeness. Patient consented to the use of audio recording and use of AI during their visit.

## 2025-07-02 ENCOUNTER — HOSPITAL ENCOUNTER (OUTPATIENT)
Dept: RADIOLOGY | Facility: CLINIC | Age: 75
Discharge: HOME | End: 2025-07-02
Payer: MEDICAID

## 2025-07-02 ENCOUNTER — OFFICE VISIT (OUTPATIENT)
Dept: DERMATOLOGY | Facility: CLINIC | Age: 75
End: 2025-07-02
Payer: MEDICAID

## 2025-07-02 DIAGNOSIS — L20.89 OTHER ATOPIC DERMATITIS: Primary | ICD-10-CM

## 2025-07-02 DIAGNOSIS — R19.8 ABDOMINAL GUARDING: ICD-10-CM

## 2025-07-02 LAB
ALBUMIN SERPL-MCNC: 4.8 G/DL (ref 3.6–5.1)
ALP SERPL-CCNC: 62 U/L (ref 37–153)
ALT SERPL-CCNC: 12 U/L (ref 6–29)
ANION GAP SERPL CALCULATED.4IONS-SCNC: 14 MMOL/L (CALC) (ref 7–17)
AST SERPL-CCNC: 16 U/L (ref 10–35)
BILIRUB SERPL-MCNC: 0.8 MG/DL (ref 0.2–1.2)
BUN SERPL-MCNC: 13 MG/DL (ref 7–25)
CALCIUM SERPL-MCNC: 9.8 MG/DL (ref 8.6–10.4)
CHLORIDE SERPL-SCNC: 106 MMOL/L (ref 98–110)
CO2 SERPL-SCNC: 22 MMOL/L (ref 20–32)
CREAT SERPL-MCNC: 0.74 MG/DL (ref 0.6–1)
EGFRCR SERPLBLD CKD-EPI 2021: 84 ML/MIN/1.73M2
GLUCOSE SERPL-MCNC: 89 MG/DL (ref 65–99)
POTASSIUM SERPL-SCNC: 4.1 MMOL/L (ref 3.5–5.3)
PROT SERPL-MCNC: 8 G/DL (ref 6.1–8.1)
SODIUM SERPL-SCNC: 142 MMOL/L (ref 135–146)

## 2025-07-02 PROCEDURE — 74177 CT ABD & PELVIS W/CONTRAST: CPT

## 2025-07-02 PROCEDURE — 99204 OFFICE O/P NEW MOD 45 MIN: CPT | Performed by: DERMATOLOGY

## 2025-07-02 PROCEDURE — 3051F HG A1C>EQUAL 7.0%<8.0%: CPT | Performed by: DERMATOLOGY

## 2025-07-02 PROCEDURE — 2550000001 HC RX 255 CONTRASTS: Performed by: STUDENT IN AN ORGANIZED HEALTH CARE EDUCATION/TRAINING PROGRAM

## 2025-07-02 RX ORDER — HYDROCORTISONE 25 MG/G
CREAM TOPICAL 2 TIMES DAILY
Qty: 30 G | Refills: 3 | Status: SHIPPED | OUTPATIENT
Start: 2025-07-02 | End: 2025-07-16

## 2025-07-02 RX ORDER — CLOBETASOL PROPIONATE 0.5 MG/G
CREAM TOPICAL 2 TIMES DAILY
Qty: 60 G | Refills: 3 | Status: SHIPPED | OUTPATIENT
Start: 2025-07-02 | End: 2025-07-16

## 2025-07-02 RX ADMIN — IOHEXOL 70 ML: 350 INJECTION, SOLUTION INTRAVENOUS at 14:08

## 2025-07-02 NOTE — Clinical Note
Patient has diffusely xerotic skin with areas of resolving erythema on the b/l legs and lateral face.

## 2025-07-02 NOTE — PROGRESS NOTES
Subjective     Najibeh Tannous is a 74 y.o. female who presents for the following: Rash (New pt. Daughter in room speaking for pt. Pt c/o dry skin all over her body for several years. Daughter stated a few years ago the pt was using a cream which helped. Triamcinolone cream was prescribed for dermatitis a few months ago but pt stated but did not work. Pt has not tried anything to help OTC or use moisturizer. ).     Rash  Patient presents for evaluation of a rash involving the generalized. Rash started several years ago. Lesions are red, and bumpy in texture when irritated. Rash has not changed over time. Rash is pruritic. Associated symptoms: arthralgia, headache, and nausea. Patient denies: abdominal pain, congestion, cough, crankiness, decrease in appetite, decrease in energy level, fever, irritability, myalgia, sore throat, and vomiting. Patient has not had contacts with similar rash. Patient has not had new exposures (soaps, lotions, laundry detergents, foods, medications, plants, insects or animals).    Review of Systems:  No other skin or systemic complaints other than what is documented elsewhere in the note.    The following portions of the chart were reviewed this encounter and updated as appropriate:       Skin Cancer History  Biopsy Log Book  No skin cancers from Specimen Tracking.    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    Past Surgical History:  Najibeh Tannous  has a past surgical history that includes Other surgical history (12/01/2014); Cholecystectomy (12/01/2014); Appendectomy (12/01/2014); Other surgical history (04/22/2020); Other surgical history (04/22/2020); and Other surgical history  (07/23/2020).    Family History:  Patient family history includes Cerebral aneurysm in her mother; Diabetes in her mother; Hypertension in her mother.       Objective   Well appearing patient in no apparent distress; mood and affect are within normal limits.    A focused skin examination was performed. All findings within normal limits unless otherwise noted below.    Assessment/Plan   Skin Exam  1. OTHER ATOPIC DERMATITIS  Generalized  Patient has diffusely xerotic skin with areas of resolving erythema on the b/l legs and lateral face.   The nature of the diagnosis was explained to patient. Pt is using Dove soap but not moisturizing. Recommended CeraVe/Aveeno/Cetaphil/Eucerin/other OTC moisturizer to be used daily after bathing to help maintain the skin barrier.   Will also prescribe clobetasol and hydrocortisone to help with flares (counseled pt/daughter that this is ONLY to be used BID x 2 weeks for flares NOT daily as can thin the skin). Long terms risks of topical steroid use including color change, thinning of the skin, and telangectasias were discussed. Risks, benefits, side effects, alternatives and options were discussed with patient and the patient voiced understanding. Handouts for eczema in [place] given. Pt/daughter agrees with plan as above.     Related Medications  hydrocortisone 2.5 % cream  Apply topically 2 times a day for 14 days. To face; DO NOT USE DAILY, only for itchy flares  clobetasol (Temovate) 0.05 % cream  Apply topically 2 times a day for 14 days. For arms/legs for severe itching; AVOID face and DO NOT USE DAILY (can thin the skin)    Follow up as needed.

## 2025-07-02 NOTE — Clinical Note
The nature of the diagnosis was explained to patient. Pt is using Dove soap but not moisturizing. Recommended CeraVe/Aveeno/Cetaphil/Eucerin/other OTC moisturizer to be used daily after bathing to help maintain the skin barrier.   Will also prescribe clobetasol and hydrocortisone to help with flares (counseled pt/daughter that this is ONLY to be used BID x 2 weeks for flares NOT daily as can thin the skin). Long terms risks of topical steroid use including color change, thinning of the skin, and telangectasias were discussed. Risks, benefits, side effects, alternatives and options were discussed with patient and the patient voiced understanding. Handouts for eczema in [place] given. Pt/daughter agrees with plan as above.

## 2025-07-03 ENCOUNTER — APPOINTMENT (OUTPATIENT)
Dept: RADIOLOGY | Facility: HOSPITAL | Age: 75
End: 2025-07-03
Payer: MEDICAID

## 2025-07-03 ENCOUNTER — HOSPITAL ENCOUNTER (EMERGENCY)
Facility: HOSPITAL | Age: 75
Discharge: HOME | End: 2025-07-03
Attending: EMERGENCY MEDICINE
Payer: MEDICAID

## 2025-07-03 ENCOUNTER — APPOINTMENT (OUTPATIENT)
Dept: CARDIOLOGY | Facility: HOSPITAL | Age: 75
End: 2025-07-03
Payer: MEDICAID

## 2025-07-03 VITALS
SYSTOLIC BLOOD PRESSURE: 169 MMHG | WEIGHT: 170 LBS | HEIGHT: 67 IN | TEMPERATURE: 98.6 F | DIASTOLIC BLOOD PRESSURE: 72 MMHG | HEART RATE: 71 BPM | RESPIRATION RATE: 21 BRPM | BODY MASS INDEX: 26.68 KG/M2 | OXYGEN SATURATION: 96 %

## 2025-07-03 DIAGNOSIS — S09.90XA CLOSED HEAD INJURY, INITIAL ENCOUNTER: ICD-10-CM

## 2025-07-03 DIAGNOSIS — R55 SYNCOPE, UNSPECIFIED SYNCOPE TYPE: Primary | ICD-10-CM

## 2025-07-03 DIAGNOSIS — S00.93XA CONTUSION OF HEAD, UNSPECIFIED PART OF HEAD, INITIAL ENCOUNTER: ICD-10-CM

## 2025-07-03 DIAGNOSIS — T14.8XXA ABRASION: ICD-10-CM

## 2025-07-03 LAB
ALBUMIN SERPL BCP-MCNC: 4.5 G/DL (ref 3.4–5)
ALP SERPL-CCNC: 54 U/L (ref 33–136)
ALT SERPL W P-5'-P-CCNC: 13 U/L (ref 7–45)
ANION GAP SERPL CALC-SCNC: 12 MMOL/L (ref 10–20)
AST SERPL W P-5'-P-CCNC: 15 U/L (ref 9–39)
BASOPHILS # BLD AUTO: 0.04 X10*3/UL (ref 0–0.1)
BASOPHILS NFR BLD AUTO: 0.5 %
BILIRUB SERPL-MCNC: 0.6 MG/DL (ref 0–1.2)
BNP SERPL-MCNC: 75 PG/ML (ref 0–99)
BUN SERPL-MCNC: 20 MG/DL (ref 6–23)
CALCIUM SERPL-MCNC: 9.6 MG/DL (ref 8.6–10.3)
CARDIAC TROPONIN I PNL SERPL HS: 6 NG/L (ref 0–13)
CARDIAC TROPONIN I PNL SERPL HS: 8 NG/L (ref 0–13)
CHLORIDE SERPL-SCNC: 104 MMOL/L (ref 98–107)
CO2 SERPL-SCNC: 27 MMOL/L (ref 21–32)
CREAT SERPL-MCNC: 0.87 MG/DL (ref 0.5–1.05)
EGFRCR SERPLBLD CKD-EPI 2021: 70 ML/MIN/1.73M*2
EOSINOPHIL # BLD AUTO: 0.13 X10*3/UL (ref 0–0.4)
EOSINOPHIL NFR BLD AUTO: 1.6 %
ERYTHROCYTE [DISTWIDTH] IN BLOOD BY AUTOMATED COUNT: 14.9 % (ref 11.5–14.5)
GLUCOSE BLD MANUAL STRIP-MCNC: 137 MG/DL (ref 74–99)
GLUCOSE SERPL-MCNC: 146 MG/DL (ref 74–99)
HCT VFR BLD AUTO: 38.4 % (ref 36–46)
HGB BLD-MCNC: 12 G/DL (ref 12–16)
IMM GRANULOCYTES # BLD AUTO: 0.01 X10*3/UL (ref 0–0.5)
IMM GRANULOCYTES NFR BLD AUTO: 0.1 % (ref 0–0.9)
LYMPHOCYTES # BLD AUTO: 1.62 X10*3/UL (ref 0.8–3)
LYMPHOCYTES NFR BLD AUTO: 20.5 %
MAGNESIUM SERPL-MCNC: 1.77 MG/DL (ref 1.6–2.4)
MCH RBC QN AUTO: 25.4 PG (ref 26–34)
MCHC RBC AUTO-ENTMCNC: 31.3 G/DL (ref 32–36)
MCV RBC AUTO: 81 FL (ref 80–100)
MONOCYTES # BLD AUTO: 0.68 X10*3/UL (ref 0.05–0.8)
MONOCYTES NFR BLD AUTO: 8.6 %
NEUTROPHILS # BLD AUTO: 5.44 X10*3/UL (ref 1.6–5.5)
NEUTROPHILS NFR BLD AUTO: 68.7 %
NRBC BLD-RTO: 0 /100 WBCS (ref 0–0)
PLATELET # BLD AUTO: 160 X10*3/UL (ref 150–450)
POTASSIUM SERPL-SCNC: 4.1 MMOL/L (ref 3.5–5.3)
PROT SERPL-MCNC: 7.6 G/DL (ref 6.4–8.2)
RBC # BLD AUTO: 4.73 X10*6/UL (ref 4–5.2)
SODIUM SERPL-SCNC: 139 MMOL/L (ref 136–145)
WBC # BLD AUTO: 7.9 X10*3/UL (ref 4.4–11.3)

## 2025-07-03 PROCEDURE — 83880 ASSAY OF NATRIURETIC PEPTIDE: CPT

## 2025-07-03 PROCEDURE — 85025 COMPLETE CBC W/AUTO DIFF WBC: CPT

## 2025-07-03 PROCEDURE — 73130 X-RAY EXAM OF HAND: CPT | Mod: LEFT SIDE | Performed by: RADIOLOGY

## 2025-07-03 PROCEDURE — 84484 ASSAY OF TROPONIN QUANT: CPT

## 2025-07-03 PROCEDURE — 73130 X-RAY EXAM OF HAND: CPT | Mod: LT

## 2025-07-03 PROCEDURE — 70486 CT MAXILLOFACIAL W/O DYE: CPT

## 2025-07-03 PROCEDURE — 36415 COLL VENOUS BLD VENIPUNCTURE: CPT

## 2025-07-03 PROCEDURE — 70450 CT HEAD/BRAIN W/O DYE: CPT

## 2025-07-03 PROCEDURE — 83735 ASSAY OF MAGNESIUM: CPT

## 2025-07-03 PROCEDURE — 76377 3D RENDER W/INTRP POSTPROCES: CPT

## 2025-07-03 PROCEDURE — 93005 ELECTROCARDIOGRAM TRACING: CPT

## 2025-07-03 PROCEDURE — 72125 CT NECK SPINE W/O DYE: CPT

## 2025-07-03 PROCEDURE — 73564 X-RAY EXAM KNEE 4 OR MORE: CPT | Mod: RT

## 2025-07-03 PROCEDURE — 96374 THER/PROPH/DIAG INJ IV PUSH: CPT

## 2025-07-03 PROCEDURE — 71045 X-RAY EXAM CHEST 1 VIEW: CPT | Performed by: RADIOLOGY

## 2025-07-03 PROCEDURE — 2500000005 HC RX 250 GENERAL PHARMACY W/O HCPCS

## 2025-07-03 PROCEDURE — 96361 HYDRATE IV INFUSION ADD-ON: CPT

## 2025-07-03 PROCEDURE — 2500000001 HC RX 250 WO HCPCS SELF ADMINISTERED DRUGS (ALT 637 FOR MEDICARE OP)

## 2025-07-03 PROCEDURE — 99285 EMERGENCY DEPT VISIT HI MDM: CPT | Performed by: EMERGENCY MEDICINE

## 2025-07-03 PROCEDURE — 80053 COMPREHEN METABOLIC PANEL: CPT

## 2025-07-03 PROCEDURE — 99285 EMERGENCY DEPT VISIT HI MDM: CPT | Mod: 25 | Performed by: EMERGENCY MEDICINE

## 2025-07-03 PROCEDURE — 2500000004 HC RX 250 GENERAL PHARMACY W/ HCPCS (ALT 636 FOR OP/ED)

## 2025-07-03 PROCEDURE — 73564 X-RAY EXAM KNEE 4 OR MORE: CPT | Mod: RIGHT SIDE | Performed by: RADIOLOGY

## 2025-07-03 PROCEDURE — 82947 ASSAY GLUCOSE BLOOD QUANT: CPT

## 2025-07-03 PROCEDURE — 71045 X-RAY EXAM CHEST 1 VIEW: CPT

## 2025-07-03 RX ORDER — BACITRACIN ZINC 500 UNIT/G
OINTMENT IN PACKET (EA) TOPICAL ONCE
Status: COMPLETED | OUTPATIENT
Start: 2025-07-03 | End: 2025-07-03

## 2025-07-03 RX ORDER — LABETALOL HYDROCHLORIDE 5 MG/ML
10 INJECTION, SOLUTION INTRAVENOUS ONCE
Status: COMPLETED | OUTPATIENT
Start: 2025-07-03 | End: 2025-07-03

## 2025-07-03 RX ORDER — ACETAMINOPHEN 325 MG/1
975 TABLET ORAL ONCE
Status: COMPLETED | OUTPATIENT
Start: 2025-07-03 | End: 2025-07-03

## 2025-07-03 RX ORDER — BACITRACIN ZINC 500 UNIT/G
OINTMENT IN PACKET (EA) TOPICAL
Status: COMPLETED
Start: 2025-07-03 | End: 2025-07-03

## 2025-07-03 RX ADMIN — LABETALOL HYDROCHLORIDE 10 MG: 5 INJECTION, SOLUTION INTRAVENOUS at 21:13

## 2025-07-03 RX ADMIN — BACITRACIN 1 APPLICATION: 500 OINTMENT TOPICAL at 22:51

## 2025-07-03 RX ADMIN — Medication 1 APPLICATION: at 22:51

## 2025-07-03 RX ADMIN — SODIUM CHLORIDE 500 ML: 0.9 INJECTION, SOLUTION INTRAVENOUS at 20:55

## 2025-07-03 RX ADMIN — ACETAMINOPHEN 975 MG: 325 TABLET ORAL at 20:54

## 2025-07-03 ASSESSMENT — PAIN DESCRIPTION - LOCATION: LOCATION: FACE

## 2025-07-03 ASSESSMENT — PAIN DESCRIPTION - PAIN TYPE: TYPE: ACUTE PAIN

## 2025-07-03 ASSESSMENT — PAIN SCALES - GENERAL
PAINLEVEL_OUTOF10: 9
PAINLEVEL_OUTOF10: 9
PAINLEVEL_OUTOF10: 6
PAINLEVEL_OUTOF10: 9

## 2025-07-03 ASSESSMENT — PAIN DESCRIPTION - PROGRESSION: CLINICAL_PROGRESSION: NOT CHANGED

## 2025-07-03 ASSESSMENT — PAIN - FUNCTIONAL ASSESSMENT: PAIN_FUNCTIONAL_ASSESSMENT: 0-10

## 2025-07-03 NOTE — ED PROVIDER NOTES
Emergency Department Provider Note        History of Present Illness     History provided by: Patient and Family Member  Limitations to History: None  External Records Reviewed with Brief Summary: None    HPI:  Najibeh Tannous is [id number] female with a history of syncope, hypertension, hyperlipidemia, type 2 diabetes mellitus, and mitral and tricuspid valve disease presents to the ED after an episode of syncope and collapse. According to the patient’s son at bedside, the patient was found on the floor by her granddaughter after falling face down onto the concrete. She sustained bruising to her forehead and the bridge of her nose and reports headache, right hand pain, and right knee pain. The patient states she experienced lightheadedness while walking in front of her house and suddenly fell to the ground. She reports losing consciousness and does not recall the event. She confirms having eaten lunch and taken her blood pressure medications earlier that day. Notably, she had a similar episode approximately two years ago around the same time. An echocardiogram from July 28, 2023, showed an estimated ejection fraction of 60-65%. The patient denies nausea, vomiting, or any sensory or motor deficits such as numbness, tingling, or weakness in the extremities. She is not on anticoagulant therapy. She denies any other joint pain or recent systemic symptoms including fever, respiratory symptoms, chest pain, abdominal pain, diarrhea, or urinary complaints.    Physical Exam   Triage vitals:  T    HR    BP    RR    O2        General: Awake, alert, in no acute distress  Eyes: Gaze conjugate.  No scleral icterus or injection  HENT: Normo-cephalic, atraumatic. No stridor.  Right frontal swelling with bruises involving the nasal bridge and nose.  CV: Regular rate, regular rhythm. Radial pulses 2+ bilaterally  Resp: Breathing non-labored, speaking in full sentences.  Clear to auscultation bilaterally  GI: Soft,  non-distended, non-tender. No rebound or guarding.  : Not examined.  MSK/Extremities: No gross bony deformities. Moving all extremities.  Left hand and right knee pain with tenderness.  Skin: Warm. Appropriate color  Neuro: Alert. Oriented. Face symmetric. Speech is fluent.  Gross strength and sensation intact in b/l UE and LEs  Psych: Appropriate mood and affect    Medical Decision Making & ED Course   Medical Decision Makin y.o. female with a history of syncope and significant cardiovascular comorbidities presenting after a witnessed fall with reported loss of consciousness and head trauma. The differential diagnosis includes cardiac syncope due to arrhythmia, valvular disease progression, or neurogenic syncope transient ischemic attack or stroke, orthostatic hypotension, and heat exhaustion. Given the fall and head impact, intracranial injury was considered.     The ED workup included vital signs monitoring, a focused neurologic exam, blood glucose, electrolytes, ECG to assess for arrhythmia or ischemic changes, and non-contrast head CT to rule out intracranial hemorrhage or acute injury. A prior echocardiogram was reviewed and demonstrated preserved ejection fraction. EKG showed normal sinus rhythm with left axis deviation, but no evidence of ischemia.     Laboratory evaluation, including CBC, chemistry panel, magnesium, and cardiac enzymes, was unremarkable. Head CT revealed no acute intracranial abnormality but did show chronic microvascular ischemic changes and age-related involutional findings. Facial CT revealed no acute facial bone fractures, though mild frontal scalp soft tissue swelling was noted. CT of the cervical spine showed no acute fracture or malalignment but demonstrated multilevel degenerative changes, most notably at C5-C6. Additional imaging, including X-rays of the left hand and right knee, showed no acute osseous abnormalities. Chest X-ray was unremarkable with no evidence of  cardiopulmonary process.      The patient's forehead and nasal bruises were cleaned and treated with antibacterial ointment. She was offered a tetanus booster, which she declined, and her last vaccination was undocumented. For symptom management, the patient received 75 mg of Tylenol for pain, 500 cc of IV normal saline for hydration, and 10 mg of labetalol for elevated blood pressure, which resulted in improvement of her readings.    The case was reviewed with ED attending Dr. Patricio, who evaluated the patient at the bedside. Given the absence of acute findings, stable vital signs, and no focal neurologic deficits, the patient was deemed stable for discharge. She was provided with return precautions and advised to follow up closely with cardiology and neurology. Fall prevention strategies were also discussed.  ----      Differential diagnoses considered include but are not limited to: Cardiac syncope due to arrhythmia, valvular disease progression, or neurogenic syncope transient ischemic attack or stroke, orthostatic hypotension, and heat exhaustion     Social Determinants of Health which Significantly Impact Care: None identified     EKG Independent Interpretation: EKG interpreted by myself. Please see ED Course for full interpretation.    Independent Result Review and Interpretation: Relevant laboratory and radiographic results were reviewed and independently interpreted by myself.  As necessary, they are commented on in the ED Course.    Chronic conditions affecting the patient's care: As documented above in [facility]    The patient was discussed with the following consultants/services: None    Care Considerations: As documented above in [facility]    ED Course:  ED Course as of 07/03/25 2217   Thu Jul 03, 2025   1854 CBC is unremarkable.  Chemistry panel is unremarkable.  Magnesium is normal.  Cardiac enzymes are normal. [CO]      ED Course User Index  [CO] Darien Sandoval MD         Diagnoses as of 07/03/25 2217    Syncope, unspecified syncope type   Closed head injury, initial encounter   Abrasion   Contusion of head, unspecified part of head, initial encounter     Disposition   As a result of the work-up, the patient was discharged home.  she was informed of her diagnosis and instructed to come back with any concerns or worsening of condition.  she and was agreeable to the plan as discussed above.  she was given the opportunity to ask questions.  All of the patient's questions were answered.    Procedures   Procedures    This was a shared visit with an ED attending.  The patient was seen and discussed with the ED attending Dr. Patricio.    Darien Sandoval MD  Emergency Medicine, PGY-2    =================Attending note===============    The patient was seen by the resident/fellow.  I have personally performed a substantive portion of the encounter.  I have seen and examined the patient; agree with the workup, evaluation, MDM,   management and diagnosis.  The care plan has been discussed with the resident; I have reviewed the resident’s note and agree with the documented findings.          History of Present Illness  This is a patient with a history of hypertension, diabetes, and hypercholesterolemia presenting with syncope and a fall. The patient arrived in the ED accompanied by their son Ricardo, who also acts as their .    The patient experienced a fainting episode while walking outside, which was preceded by lightheadedness and dizziness. This is the third such incident, occurring once annually for the past three years, typically during hot weather. They had been walking for approximately 10 minutes before feeling dizzy and subsequently fainting. The patient was unsure of the duration of their unconsciousness, which their son estimates to be from seconds to minutes. Upon regaining consciousness, they contacted their family. They had been feeling slightly dizzy since the previous day but report no chest pain,  shortness of breath, nausea, vomiting, or diarrhea. Their appetite and hydration status are normal. They are not currently on any anticoagulant therapy.    The patient reports no abdominal, chest, shoulder, or elbow pain.   There is some pain in the left hand and the right knee       Physical Exam  General Appearance: Appears well, alert  Vital signs: Within normal limits  HEENT: Abrasions and contusion to the face.  Eyes normal inspection, Normal ENT inspection  Respiratory: Clear to auscultation bilaterally  Cardiovascular: Heart rate and rhythm regular, no murmurs  Gastrointestinal: No abdominal pain on palpation  Back, Musculoskeletal: Normal  Extremities: No pain in shoulders or elbows. Mild pain in hand with a small scrape.  Full  strength.  Full range of motion of all digits.  There is a minor abrasion on the hand.  There is mild tenderness in the right knee.  There is no laxity.  No significant swelling.  Pedal pulses intact.  Cap refill brisk.  Sensation intact.  Skin: Warm and dry, no rash  Neurological: Normal  Psychiatric: Normal         EKG: Normal sinus rhythm with a ventricular rate of 81.  .  QTc 425.  No ST elevation.    CBC is unremarkable.  Chemistry is unremarkable except for glucose of 146.  Troponin negative.  Magnesium normal.  BNP normal.  ==========================================       Darien Sandoval MD  Resident  07/04/25 0944

## 2025-07-04 PROBLEM — L98.9 FACE LESION: Status: ACTIVE | Noted: 2025-07-04

## 2025-07-04 PROBLEM — R19.8 ABDOMINAL GUARDING: Status: ACTIVE | Noted: 2025-07-04

## 2025-07-04 PROBLEM — H53.9 VISION CHANGES: Status: ACTIVE | Noted: 2025-07-04

## 2025-07-04 LAB
ATRIAL RATE: 81 BPM
P AXIS: 56 DEGREES
P OFFSET: 194 MS
P ONSET: 133 MS
PR INTERVAL: 184 MS
Q ONSET: 225 MS
QRS COUNT: 14 BEATS
QRS DURATION: 72 MS
QT INTERVAL: 366 MS
QTC CALCULATION(BAZETT): 425 MS
QTC FREDERICIA: 404 MS
R AXIS: -13 DEGREES
T AXIS: 84 DEGREES
T OFFSET: 408 MS
VENTRICULAR RATE: 81 BPM

## 2025-07-04 NOTE — DISCHARGE INSTRUCTIONS
Seek immediate medical attention if you develop: new or worsening headache, nausea, vomiting, confusion, weakness, loss of motion in your arms or legs, loss of control of your urine or stool, difficulty waking from sleep, or any new or worsening symptoms.    Have someone check on you and wake you from sleep every 2 hours for the next 24 hours to make sure that you are doing well.    Seek immediate medical attention if your wound develops: redness, swelling, warmth to touch, discharge or drainage, increasing pain, or any new or worsening symptoms.    Seek immediate medical attention if you develop: worsening dizziness or passing out, chest pain, nausea, vomiting, weakness, numbness, tingling, excessive sweating, shortness of breath, difficulty breathing, loss of motion in your arms or legs, or any new or worsening symptoms.

## 2025-07-16 ENCOUNTER — OFFICE VISIT (OUTPATIENT)
Dept: CARDIOLOGY | Facility: CLINIC | Age: 75
End: 2025-07-16
Payer: MEDICAID

## 2025-07-16 ENCOUNTER — APPOINTMENT (OUTPATIENT)
Dept: CARDIOLOGY | Facility: CLINIC | Age: 75
End: 2025-07-16
Payer: MEDICAID

## 2025-07-16 VITALS
WEIGHT: 175.6 LBS | SYSTOLIC BLOOD PRESSURE: 142 MMHG | HEART RATE: 67 BPM | DIASTOLIC BLOOD PRESSURE: 62 MMHG | HEIGHT: 67 IN | BODY MASS INDEX: 27.56 KG/M2

## 2025-07-16 DIAGNOSIS — R55 SYNCOPE AND COLLAPSE: Primary | ICD-10-CM

## 2025-07-16 DIAGNOSIS — I51.7 LVH (LEFT VENTRICULAR HYPERTROPHY): ICD-10-CM

## 2025-07-16 DIAGNOSIS — I10 BENIGN ESSENTIAL HYPERTENSION: Chronic | ICD-10-CM

## 2025-07-16 DIAGNOSIS — E78.5 HYPERLIPIDEMIA, MILD: Chronic | ICD-10-CM

## 2025-07-16 PROCEDURE — 3077F SYST BP >= 140 MM HG: CPT | Performed by: NURSE PRACTITIONER

## 2025-07-16 PROCEDURE — 3051F HG A1C>EQUAL 7.0%<8.0%: CPT | Performed by: NURSE PRACTITIONER

## 2025-07-16 PROCEDURE — 3078F DIAST BP <80 MM HG: CPT | Performed by: NURSE PRACTITIONER

## 2025-07-16 PROCEDURE — 99204 OFFICE O/P NEW MOD 45 MIN: CPT | Performed by: NURSE PRACTITIONER

## 2025-07-16 PROCEDURE — 93000 ELECTROCARDIOGRAM COMPLETE: CPT | Performed by: NURSE PRACTITIONER

## 2025-07-16 PROCEDURE — 1159F MED LIST DOCD IN RCRD: CPT | Performed by: NURSE PRACTITIONER

## 2025-07-16 PROCEDURE — 1160F RVW MEDS BY RX/DR IN RCRD: CPT | Performed by: NURSE PRACTITIONER

## 2025-07-16 RX ORDER — MELOXICAM 15 MG/1
15 TABLET ORAL DAILY
COMMUNITY

## 2025-07-16 NOTE — PROGRESS NOTES
Name : Najibeh Tannous   : 1950   MRN : [id number]   ENC Date : 2025    CC:   Hospital Follow-up, Syncope, Hypertension, and Hyperlipidemia     HPI:    Najibeh Tannous is a 74 y.o. female with PMHx sig for HTN, HLD & Diabetes who presents with her Daughter today as above.    ED [facility] 7/3/25 Syncope & Collapse. Fell on concrete face first after feeling lightheadedness while walking. Given 500 mls IVF & labetolol 10mg for elevated bp. Daughter tells me that it was very hot out & Najbeh was walking home from her cousin's house who lives     Workup in ED was unremarkable. Daughter tells me that she syncopized almost a year ago to the day. That time she was helping her son in law unload groceries from the car & just passed out.    Echo  after syncope EF 60-65%, impaired relaxation, mild to mod tricuspid regurgitation. Mildly elevated RVSP.    EKG today shows Sinus Rhythm with LVH      ROS: unless otherwise noted in the history of present illness, all other systems were reviewed and they are negative for complaints     PMH:  As above    PSH:  She has a past surgical history that includes Other surgical history (2014); Cholecystectomy (2014); Appendectomy (2014); Other surgical history (2020); Other surgical history (2020); and Other surgical history (2020).    SHx:  She reports that she has never smoked. She has never been exposed to tobacco smoke. She has never used smokeless tobacco. She reports that she does not drink alcohol and does not use drugs.    FHx:  Family History   Problem Relation Name Age of Onset    Cerebral aneurysm Mother      Diabetes Mother      Hypertension Mother        Allergies:  Aspirin, Atorvastatin calcium, and Pioglitazone    Outpatient Medications:  Current Outpatient Medications   Medication Instructions    albuterol 90 mcg/actuation inhaler 1-2 puffs, inhalation, Every 6 hours PRN    amLODIPine (NORVASC) 5 mg, oral, Daily, for blood pressure  "   blood sugar diagnostic (OneTouch Ultra Test) strip TEST GLUCOSE ONCE DAILY AS DIRECTED FOR DIABETES. ONE TOUCH BLUE ULTRA    blood-glucose meter misc One touch ultra    cetirizine (ZYRTEC) 10 mg, oral, Daily    ergocalciferol (VITAMIN D-2) 50,000 Units, oral, Once Weekly    fluticasone (Flonase) 50 mcg/actuation nasal spray 1-2 sprays, Each Nostril, Daily, Shake gently. Before first use, prime pump. After use, clean tip and replace cap.    glyBURIDE (DIABETA) 5 mg, oral, 2 times daily (morning and late afternoon)    hydrocortisone 2.5 % cream Topical, 2 times daily, To face; DO NOT USE DAILY, only for itchy flares    lancets (Easy Comfort Lancets) 30 gauge misc TEST ONCE A DAY    lidocaine (Lidoderm) 5 % patch 1 PATCH TO AFFECTED AREA (12 HOURS ON - 12 HOURS OFF)    meloxicam (MOBIC) 15 mg, Daily    metFORMIN XR (GLUCOPHAGE-XR) 500 mg, oral, Daily with evening meal, Do not crush, chew, or split    nystatin (Mycostatin) cream APPLY 1 APPLICATION TOPICALLY 3 TIMES A DAY. APPLY 3 TIMES DAILY FOR 1 WEEK    pantoprazole (PROTONIX) 40 mg, oral, 2 times daily, Do not crush, chew, or split.    rosuvastatin (CRESTOR) 20 mg, oral, Daily    triamcinolone (Kenalog) 0.1 % cream Topical, 2 times daily, Apply to affected area 1-2 times daily as needed.    Vitamin B-12 2,500 mcg tablet, sublingual SL tablet PLACE 2 TABLETS (5,000 MCG) UNDER THE TONGUE ONCE DAILY.     Last Recorded Vitals:  Vitals:    07/16/25 1611   BP: 142/62   BP Location: Left arm   Patient Position: Sitting   Pulse: 67   Weight: 79.7 kg (175 lb 9.6 oz)   Height: 1.702 m (5' 7\")     Physical Exam:  On exam Ms. Najibeh Tannous appears her stated age, is alert and oriented x3, and in no acute distress. Her sclera are anicteric and her oropharynx has moist mucous membranes. Her neck is supple and without thyromegaly. The JVP is ~5 cm of water above the right atrium. Her cardiac exam has regular rhythm, normal S1, S2. No S3/4. There are no murmurs. Her lungs are " clear to auscultation bilaterally and there is no dullness to percussion. Her abdomen is soft, nontender with normoactive bowel sounds. There is no HJR. The extremities are warm and without edema. The skin is dry. There is no rash present. The distal pulses are 2-3+ in all four extremities. Her mood and affect are appropriate for todays encounter.      Last Labs:  CBC -  Lab Results   Component Value Date    WBC 7.9 07/03/2025    HGB 12.0 07/03/2025    HCT 38.4 07/03/2025    MCV 81 07/03/2025     07/03/2025       CMP -  Lab Results   Component Value Date    CALCIUM 9.6 07/03/2025    CALCIUM 9.8 07/01/2025    PHOS 3.6 07/28/2023    PROT 7.6 07/03/2025    PROT 8.0 07/01/2025    ALBUMIN 4.5 07/03/2025    ALBUMIN 4.8 07/01/2025    AST 15 07/03/2025    AST 16 07/01/2025    ALT 13 07/03/2025    ALT 12 07/01/2025    ALKPHOS 54 07/03/2025    ALKPHOS 62 07/01/2025    BILITOT 0.6 07/03/2025    BILITOT 0.8 07/01/2025       LIPID PANEL -   Lab Results   Component Value Date    CHOL 119 10/22/2024    TRIG 162 (H) 10/22/2024    HDL 38.0 10/22/2024    CHHDL 3.1 10/22/2024    LDLF 139 (H) 11/05/2021    VLDL 32 10/22/2024    NHDL 81 10/22/2024       RENAL FUNCTION PANEL -   Lab Results   Component Value Date    GLUCOSE 146 (H) 07/03/2025    GLUCOSE 89 07/01/2025     07/03/2025     07/01/2025    K 4.1 07/03/2025    K 4.1 07/01/2025     07/03/2025     07/01/2025    CO2 27 07/03/2025    CO2 22 07/01/2025    ANIONGAP 12 07/03/2025    ANIONGAP 14 07/01/2025    BUN 20 07/03/2025    BUN 13 07/01/2025    CREATININE 0.87 07/03/2025    CREATININE 0.74 07/01/2025    CALCIUM 9.6 07/03/2025    CALCIUM 9.8 07/01/2025    PHOS 3.6 07/28/2023    ALBUMIN 4.5 07/03/2025    ALBUMIN 4.8 07/01/2025        Lab Results   Component Value Date    BNP 75 07/03/2025    HGBA1C 7.4 (A) 07/01/2025     I have personally reviewed the above lab results: CBC, chemistry, other labs as you see listed & diagnostics, I have specifically  listed the results of these tests above.    Assessment/Plan:  Syncope. Given the time between events, will get a 30 day monitor    LVH. On EKG. Risk factors for such. Also concerned that LVOTO could cause her symptoms. Will get echocardiogram    Hypertension. /62 mmHg today. Elevated today. Repeat next visit. Likely needs tighter control given her diabetes.    Hyperlipidemia. Tolerating statin therapy    Follow up after testing    Tracy M Schwab, APRN-CNP

## 2025-07-21 DIAGNOSIS — B35.4 TINEA CORPORIS: ICD-10-CM

## 2025-07-21 DIAGNOSIS — R09.82 POST-NASAL DRIP: ICD-10-CM

## 2025-07-21 DIAGNOSIS — R05.3 CHRONIC COUGH: ICD-10-CM

## 2025-07-21 RX ORDER — FLUTICASONE PROPIONATE 50 MCG
1-2 SPRAY, SUSPENSION (ML) NASAL DAILY
Qty: 16 G | Refills: 0 | Status: SHIPPED | OUTPATIENT
Start: 2025-07-21 | End: 2026-07-21

## 2025-07-21 RX ORDER — NYSTATIN 100000 U/G
CREAM TOPICAL
Qty: 60 G | Refills: 0 | Status: SHIPPED | OUTPATIENT
Start: 2025-07-21

## 2025-07-21 RX ORDER — ALBUTEROL SULFATE 90 UG/1
1-2 INHALANT RESPIRATORY (INHALATION) EVERY 6 HOURS PRN
Qty: 18 G | Refills: 1 | Status: SHIPPED | OUTPATIENT
Start: 2025-07-21 | End: 2026-07-21

## 2025-07-23 ENCOUNTER — APPOINTMENT (OUTPATIENT)
Dept: PHARMACY | Facility: HOSPITAL | Age: 75
End: 2025-07-23
Payer: MEDICAID

## 2025-07-23 NOTE — PROGRESS NOTES
Clinical Pharmacy Appointment    Patient ID: Najibeh Tannous is a 74 y.o. female who presents for No chief complaint on file..    Pt is here for First appointment.     Referring Provider: Tanvir Fierro DO  PCP: Becca Faith MD  Last visit with PCP: ***   Next visit with PCP: ***    Subjective     DIABETES MELLITUS TYPE 2:    Diagnosed with diabetes:  ***.   Known diabetic complications: ***.  Does patient follow with Endocrinology: {YES/NO:07225}  Last optometry exam: ***  Most recent visit in Podiatry: ***-- patient {DM reports/denies:65949} sores or cuts on feet today      Current diabetic medications include:  Glyburide 2.5mg: take 2 tablets twice daily  Metformin XR 500mg: take 1 tablet once daily    Clarifications to above regimen: ***  Adverse Effects: ***    Past diabetic medications include:      Glucose Readings:  Glucometer/CGM Type: ***  Patient tests BG *** times per day    Current home BG readings (mg/dL): ***   Previous home BG readings (mg/dL): ***    Any episodes of hypoglycemia? {YES/NO/NA:13882}.  Did patient treat episode of hypoglycemia appropriately? {YES/NO/NA:17522}  Does the patient have a prescription for ready-to-use Glucagon? {Diabetes Hypoglycemia:52700}    Lifestyle:  Diet: *** meals/day. ***  BK: ***  LN: ***  DN: ***  Snacks: ***  Drinks: ***  Exercise: {exercise:78189}  ***  Is limited by: {Exercise limitations:25762}  Tobacco history: ***    Secondary Prevention:  Statin? Yes  ACE-I/ARB? No  Aspirin? No    Pertinent PMH Review:  PMH of Pancreatitis: {YES,NO:79655}  PMH of Retinopathy: {YES,NO:10599}  PMH of Urinary Tract Infections: {YES,NO:55112}  PMH of MTC: {YES,NO:36970}  PMH of MEN2: {YES,NO:47380}  UACR/EGFR in last year?: No  Albumin/Creatinine Ratio   Date Value Ref Range Status   10/20/2023 286.3 (H) <30.0 ug/mg Creat Final     Albumin/Creatine Ratio   Date Value Ref Range Status   11/05/2021 198.4 (H) 0.0 - 30.0 ug/mg crt Final     Medication Reconciliation:  Changed:  ***  Added: ***  Discontinued: ***    Drug Interactions  {Drug Interactions:16527}    Medication System Management  Patient's preferred pharmacy: ***  Adherence/Organization: ***  Affordability/Accessibility: ***      Interval History      HPI  ***insert disease state specific HPI      Objective   Allergies[1]  Social History     Social History Narrative    Not on file      Medication Review  Current Outpatient Medications   Medication Instructions    albuterol 90 mcg/actuation inhaler 1-2 puffs, inhalation, Every 6 hours PRN    amLODIPine (NORVASC) 5 mg, oral, Daily, for blood pressure    blood sugar diagnostic (OneTouch Ultra Test) strip TEST GLUCOSE ONCE DAILY AS DIRECTED FOR DIABETES. ONE TOUCH BLUE ULTRA    blood-glucose meter misc One touch ultra    cetirizine (ZYRTEC) 10 mg, oral, Daily    ergocalciferol (VITAMIN D-2) 50,000 Units, oral, Once Weekly    fluticasone (Flonase) 50 mcg/actuation nasal spray 1-2 sprays, Each Nostril, Daily, Shake gently. Before first use, prime pump. After use, clean tip and replace cap.    glyBURIDE (DIABETA) 5 mg, oral, 2 times daily (morning and late afternoon)    hydrocortisone 2.5 % cream Topical, 2 times daily, To face; DO NOT USE DAILY, only for itchy flares    lancets (Easy Comfort Lancets) 30 gauge misc TEST ONCE A DAY    lidocaine (Lidoderm) 5 % patch 1 PATCH TO AFFECTED AREA (12 HOURS ON - 12 HOURS OFF)    meloxicam (MOBIC) 15 mg, Daily    metFORMIN XR (GLUCOPHAGE-XR) 500 mg, oral, Daily with evening meal, Do not crush, chew, or split    nystatin (Mycostatin) cream APPLY 1 APPLICATION TOPICALLY 3 TIMES A DAY. APPLY 3 TIMES DAILY FOR 1 WEEK    pantoprazole (PROTONIX) 40 mg, oral, 2 times daily, Do not crush, chew, or split.    rosuvastatin (CRESTOR) 20 mg, oral, Daily    triamcinolone (Kenalog) 0.1 % cream Topical, 2 times daily, Apply to affected area 1-2 times daily as needed.    Vitamin B-12 2,500 mcg tablet, sublingual SL tablet PLACE 2 TABLETS (5,000 MCG) UNDER THE  TONGUE ONCE DAILY.      Vitals  BP Readings from Last 2 Encounters:   07/16/25 142/62   07/03/25 169/72     BMI Readings from Last 1 Encounters:   07/16/25 27.50 kg/m²      Labs  A1C  Lab Results   Component Value Date    HGBA1C 7.4 (A) 07/01/2025    HGBA1C 7.3 (A) 02/26/2025    HGBA1C 6.7 (A) 10/22/2024     BMP  Lab Results   Component Value Date    CALCIUM 9.6 07/03/2025     07/03/2025    K 4.1 07/03/2025    CO2 27 07/03/2025     07/03/2025    BUN 20 07/03/2025    CREATININE 0.87 07/03/2025    EGFR 70 07/03/2025     LFTs  Lab Results   Component Value Date    ALT 13 07/03/2025    AST 15 07/03/2025    ALKPHOS 54 07/03/2025    BILITOT 0.6 07/03/2025     FLP  Lab Results   Component Value Date    TRIG 162 (H) 10/22/2024    CHOL 119 10/22/2024    LDLF 139 (H) 11/05/2021    LDLCALC 49 10/22/2024    HDL 38.0 10/22/2024     Urine Microalbumin  Lab Results   Component Value Date    MICROALBCREA 286.3 (H) 10/20/2023     Weight Management  Wt Readings from Last 3 Encounters:   07/16/25 79.7 kg (175 lb 9.6 oz)   07/03/25 77.1 kg (170 lb)   07/01/25 79.5 kg (175 lb 3.2 oz)      There is no height or weight on file to calculate BMI.     Assessment/Plan   Problem List Items Addressed This Visit    None    Coverage is provided when the member meets all of the following:   Coverage is provided when a member has a history of at least 120 days of therapy with 3 preferred (Medication covered by the plan) medications [One of the 120 day trials must be Victoza 18 mg/3 mL Pen or Trulicity 0.75 mg, 1.5 mg, 3 mg or 4.5 mg]. Other trials may include but are not limited to Farxiga 5/10 mg (brand name), and Jardiance 10/25 mg. Glimepiride, glipizide, Januvia, and Metformin  mg, 850 mg, 1000 mg and ER (generics of glucophage).  Member An inadequate clinical response is defined as the inability to reach A1C goal after at least 120 days of current regimen, with use of two or more drugs concomitantly per ADA guidelines,  documented adherence, and appropriate dose escalation (must achieve maximum recommended dose or document that maximum recommended dose is not tolerated or is clinically inappropriate).  Must include a patient specific A1C goal if less than 7%  Must include current A1C (within last 6 months)  For non-preferred drugs that have preferred drugs in the same drug class: must provide documentation that there was at least one inadequate clinical response with a drug in same drug class     Clinical Pharmacist follow-up: ***, {In-Person / Telehealth:66767} visit  Patient is *** followed in [facility]. (If yes, track minutes under compass zabrina at each visit)    Continue all meds under the continuation of care with the referring provider and clinical pharmacy team.    Thank you,  *** (Name)  Clinical Pharmacist  *** (Phone Number)    Verbal consent to manage patient's drug therapy was obtained from {patient rights:58710}. They were informed they may decline to participate or withdraw from participation in pharmacy services at any time.          [1]   Allergies  Allergen Reactions    Aspirin Shortness of breath     CHOKING    Atorvastatin Calcium Other     Caused headaches.    Pioglitazone Other     Edema

## 2025-08-05 ENCOUNTER — APPOINTMENT (OUTPATIENT)
Dept: PHARMACY | Facility: HOSPITAL | Age: 75
End: 2025-08-05
Payer: MEDICAID

## 2025-08-05 ENCOUNTER — HOSPITAL ENCOUNTER (OUTPATIENT)
Dept: CARDIOLOGY | Facility: HOSPITAL | Age: 75
Discharge: HOME | End: 2025-08-05
Payer: MEDICAID

## 2025-08-05 DIAGNOSIS — E11.65 TYPE 2 DIABETES MELLITUS WITH HYPERGLYCEMIA, WITHOUT LONG-TERM CURRENT USE OF INSULIN: Primary | Chronic | ICD-10-CM

## 2025-08-05 DIAGNOSIS — R55 SYNCOPE AND COLLAPSE: ICD-10-CM

## 2025-08-05 DIAGNOSIS — I51.7 LVH (LEFT VENTRICULAR HYPERTROPHY): ICD-10-CM

## 2025-08-05 PROCEDURE — 93306 TTE W/DOPPLER COMPLETE: CPT | Performed by: INTERNAL MEDICINE

## 2025-08-05 PROCEDURE — 93306 TTE W/DOPPLER COMPLETE: CPT

## 2025-08-05 PROCEDURE — 93270 REMOTE 30 DAY ECG REV/REPORT: CPT

## 2025-08-05 NOTE — PROGRESS NOTES
Clinical Pharmacy Appointment    Patient ID: Najibeh Tannous is a 74 y.o. female who presents for Diabetes.    Pt is here for First appointment.     Referring Provider: Tanvir Fierro DO  PCP: Becca Faith MD  Last visit with PCP: 7/1/25   Next visit with PCP: 8/26/25    Subjective   HPI  Najibeh Tannous is a 74 y.o. year old female patient referred to Clinical Pharmacy. She has a PMH of arrhythmia, HTN, HLD, mitral/tricuspid valve insufficiency, & T2DM. Prior to her initial visit with pharmacy her diabetes is not well controlled. Her last A1c was 7.4% on 7/1/25. Her last UACR was 10/20/23 which did evidence microalbuminuria. She is currently managed on Metformin 500mg once daily and Glyburide 2.5mg (2 tablets twice daily). She is presenting today for an initial diabetes management visit.     DIABETES MELLITUS TYPE 2:    Known diabetic complications: microalbuminuria (2023).  Does patient follow with Endocrinology: No  Last optometry exam: yearly routine visit  Most recent visit in Podiatry: does not regularly visit      Current diabetic medications include:  Glyburide 2.5mg: take 2 tablets twice daily  Metformin XR 500mg: take 1 tablet once daily    Clarifications to above regimen: taking glyburide 2.5mg (2 tablets in the morning, 1 tablet in the evening)  Adverse Effects: none    Past diabetic medications include:  Pioglitazone - swelling     Glucose Readings:  Glucometer/CGM Type: One Touch Ultra  Patient tests BG 1 times per day - FBG    Current home BG readings (mg/dL):   No formal BG log present   Report that her FBG range is 120-130 mg/dL     Previous home BG readings (mg/dL): n/a    Any episodes of hypoglycemia? Yes, 1-2 times this past month.  Did patient treat episode of hypoglycemia appropriately? Yes, candy   Does the patient have a prescription for ready-to-use Glucagon? Not on insulin    Lifestyle:  Not discussed    Secondary Prevention:  Statin? Yes  ACE-I/ARB? No  Aspirin? No    Pertinent PMH  Review:  PMH of Pancreatitis: No  PMH of Retinopathy: No  PMH of Urinary Tract Infections: Yes  PMH of MTC: No  PMH of MEN2: No  UACR/EGFR in last year?: No  Albumin/Creatinine Ratio   Date Value Ref Range Status   10/20/2023 286.3 (H) <30.0 ug/mg Creat Final     Albumin/Creatine Ratio   Date Value Ref Range Status   2021 198.4 (H) 0.0 - 30.0 ug/mg crt Final     HYPERLIPIDEMIA ASSESSMENT  LDL Goal: <70 mg/dL  Last LDL of 49 mg/dL on 10/22/24    Medication Therapy  Current medications include:  Rosuvastatin 20m tablet once daily    Clarifications to above regimen: none  Adverse Effects: none  Previous Medications: none     Drug Interactions  No relevant drug interactions were noted.    Medication System Management  Patient's preferred pharmacy: [facility]  Adherence/Organization: no issues reported  Affordability/Accessibility:   Medicaid insurance    Objective   Allergies[1]  Social History     Social History Narrative    Not on file      Medication Review  Current Outpatient Medications   Medication Instructions    albuterol 90 mcg/actuation inhaler 1-2 puffs, inhalation, Every 6 hours PRN    amLODIPine (NORVASC) 5 mg, oral, Daily, for blood pressure    blood sugar diagnostic (OneTouch Ultra Test) strip TEST GLUCOSE ONCE DAILY AS DIRECTED FOR DIABETES. ONE TOUCH BLUE ULTRA    blood-glucose meter misc One touch ultra    cetirizine (ZYRTEC) 10 mg, oral, Daily    ergocalciferol (VITAMIN D-2) 50,000 Units, oral, Once Weekly    fluticasone (Flonase) 50 mcg/actuation nasal spray 1-2 sprays, Each Nostril, Daily, Shake gently. Before first use, prime pump. After use, clean tip and replace cap.    glyBURIDE (DIABETA) 5 mg, oral, 2 times daily (morning and late afternoon)    hydrocortisone 2.5 % cream Topical, 2 times daily, To face; DO NOT USE DAILY, only for itchy flares    lancets (Easy Comfort Lancets) 30 gauge misc TEST ONCE A DAY    lidocaine (Lidoderm) 5 % patch 1 PATCH TO AFFECTED AREA  (12 HOURS ON - 12 HOURS OFF)    meloxicam (MOBIC) 15 mg, Daily    metFORMIN XR (GLUCOPHAGE-XR) 500 mg, oral, Daily with evening meal, Do not crush, chew, or split    nystatin (Mycostatin) cream APPLY 1 APPLICATION TOPICALLY 3 TIMES A DAY. APPLY 3 TIMES DAILY FOR 1 WEEK    pantoprazole (PROTONIX) 40 mg, oral, 2 times daily, Do not crush, chew, or split.    rosuvastatin (CRESTOR) 20 mg, oral, Daily    triamcinolone (Kenalog) 0.1 % cream Topical, 2 times daily, Apply to affected area 1-2 times daily as needed.    Vitamin B-12 2,500 mcg tablet, sublingual SL tablet PLACE 2 TABLETS (5,000 MCG) UNDER THE TONGUE ONCE DAILY.      Vitals  BP Readings from Last 2 Encounters:   07/16/25 142/62   07/03/25 169/72     BMI Readings from Last 1 Encounters:   07/16/25 27.50 kg/m²      Labs  A1C  Lab Results   Component Value Date    HGBA1C 7.4 (A) 07/01/2025    HGBA1C 7.3 (A) 02/26/2025    HGBA1C 6.7 (A) 10/22/2024     BMP  Lab Results   Component Value Date    CALCIUM 9.6 07/03/2025     07/03/2025    K 4.1 07/03/2025    CO2 27 07/03/2025     07/03/2025    BUN 20 07/03/2025    CREATININE 0.87 07/03/2025    EGFR 70 07/03/2025     LFTs  Lab Results   Component Value Date    ALT 13 07/03/2025    AST 15 07/03/2025    ALKPHOS 54 07/03/2025    BILITOT 0.6 07/03/2025     FLP  Lab Results   Component Value Date    TRIG 162 (H) 10/22/2024    CHOL 119 10/22/2024    LDLF 139 (H) 11/05/2021    LDLCALC 49 10/22/2024    HDL 38.0 10/22/2024     Urine Microalbumin  Lab Results   Component Value Date    MICROALBCREA 286.3 (H) 10/20/2023     Weight Management  Wt Readings from Last 3 Encounters:   07/16/25 79.7 kg (175 lb 9.6 oz)   07/03/25 77.1 kg (170 lb)   07/01/25 79.5 kg (175 lb 3.2 oz)      There is no height or weight on file to calculate BMI.     Assessment/Plan   Problem List Items Addressed This Visit       Type 2 diabetes mellitus with hyperglycemia, without long-term current use of insulin - Primary (Chronic)    Patients  diabetes is currently not well controlled. Last A1c on 7/1/25 was 7.4% (Goal <7%). She is currently managed on Metformin XR 500mg once daily and Glyburide 2.5mg (2 tablets in the morning, and 1 tablet in the evening). No issues with missed doses reported. No formal BG log was present at todays visit. Her daughter reports that she is checking her FBG once daily in the morning. Reports that her FBG range is 120-130 mg/dL. Discussed FBG goal of <130 mg/dL. She reports 1-2 episodes of hypoglycemia this past month. Patient corrected her blood sugar appropriately.     Discussed need for further BG control with Trulicity therapy based on insurance eligibility. Discussed side effect profile. Patients daughter is agreeable to initiation of therapy however would like to defer this change for ~2 weeks as they have an important upcoming wedding. She would not like to introduce any medication changes until after this event due to possible side effects. Follow up scheduled in 2 weeks. Will plan to repeat A1c as appropriate and complete an updated UACR. Discussed ultimate goal of stopping Glyburide therapy with initiation of Trulicity.              Relevant Orders    Referral to Clinical Pharmacy     Coverage for Mounjaro or Ozempic is provided when the member meets all of the following:   Coverage is provided when a member has a history of at least 120 days of therapy with 3 preferred (Medication covered by the plan) medications [One of the 120 day trials must be Victoza 18 mg/3 mL Pen or Trulicity 0.75 mg, 1.5 mg, 3 mg or 4.5 mg]. Other trials may include but are not limited to Farxiga 5/10 mg (brand name), and Jardiance 10/25 mg. Glimepiride, glipizide, Januvia, and Metformin  mg, 850 mg, 1000 mg and ER (generics of glucophage).  Member An inadequate clinical response is defined as the inability to reach A1C goal after at least 120 days of current regimen, with use of two or more drugs concomitantly per ADA guidelines,  documented adherence, and appropriate dose escalation (must achieve maximum recommended dose or document that maximum recommended dose is not tolerated or is clinically inappropriate).  Must include a patient specific A1C goal if less than 7%  Must include current A1C (within last 6 months)  For non-preferred drugs that have preferred drugs in the same drug class: must provide documentation that there was at least one inadequate clinical response with a drug in same drug class     Clinical Pharmacist follow-up: 8/18/25, Telehealth visit  Patient is not followed in [facility]. (If yes, track minutes under compass zabrina at each visit)    Continue all meds under the continuation of care with the referring provider and clinical pharmacy team.    Thank you,  Anel Acosta  Clinical Pharmacist  [phone]    Verbal consent to manage patient's drug therapy was obtained from an individual authorized to act on behalf of a patient. They were informed they may decline to participate or withdraw from participation in pharmacy services at any time.          [1]   Allergies  Allergen Reactions    Aspirin Shortness of breath     CHOKING    Atorvastatin Calcium Other     Caused headaches.    Pioglitazone Other     Edema

## 2025-08-05 NOTE — Clinical Note
No medication changes at todays visit. Plan for initiation of Trulicity therapy after her family wedding in ~2 weeks.

## 2025-08-06 LAB
AORTIC VALVE MEAN GRADIENT: 6 MMHG
AORTIC VALVE PEAK VELOCITY: 1.62 M/S
AV PEAK GRADIENT: 10 MMHG
AVA (PEAK VEL): 1.66 CM2
AVA (VTI): 1.82 CM2
EJECTION FRACTION APICAL 4 CHAMBER: 64.6
EJECTION FRACTION: 65 %
LEFT ATRIUM VOLUME AREA LENGTH INDEX BSA: 27 ML/M2
LEFT VENTRICLE INTERNAL DIMENSION DIASTOLE: 4.7 CM (ref 3.5–6)
LEFT VENTRICULAR OUTFLOW TRACT DIAMETER: 1.9 CM
LV EJECTION FRACTION BIPLANE: 65 %
MITRAL VALVE E/A RATIO: 0.89
RIGHT VENTRICLE FREE WALL PEAK S': 13.7 CM/S
RIGHT VENTRICLE PEAK SYSTOLIC PRESSURE: 37 MMHG
TRICUSPID ANNULAR PLANE SYSTOLIC EXCURSION: 2.8 CM

## 2025-08-08 NOTE — ASSESSMENT & PLAN NOTE
Patients diabetes is currently not well controlled. Last A1c on 7/1/25 was 7.4% (Goal <7%). She is currently managed on Metformin XR 500mg once daily and Glyburide 2.5mg (2 tablets in the morning, and 1 tablet in the evening). No issues with missed doses reported. No formal BG log was present at todays visit. Her daughter reports that she is checking her FBG once daily in the morning. Reports that her FBG range is 120-130 mg/dL. Discussed FBG goal of <130 mg/dL. She reports 1-2 episodes of hypoglycemia this past month. Patient corrected her blood sugar appropriately.     Discussed need for further BG control with Trulicity therapy based on insurance eligibility. Discussed side effect profile. Patients daughter is agreeable to initiation of therapy however would like to defer this change for ~2 weeks as they have an important upcoming wedding. She would not like to introduce any medication changes until after this event due to possible side effects. Follow up scheduled in 2 weeks. Will plan to repeat A1c as appropriate and complete an updated UACR. Discussed ultimate goal of stopping Glyburide therapy with initiation of Trulicity.

## 2025-08-12 DIAGNOSIS — I10 BENIGN ESSENTIAL HTN: ICD-10-CM

## 2025-08-12 RX ORDER — AMLODIPINE BESYLATE 5 MG/1
5 TABLET ORAL DAILY
Qty: 90 TABLET | Refills: 1 | Status: SHIPPED | OUTPATIENT
Start: 2025-08-12 | End: 2026-08-07

## 2025-08-18 ENCOUNTER — APPOINTMENT (OUTPATIENT)
Dept: PHARMACY | Facility: HOSPITAL | Age: 75
End: 2025-08-18
Payer: MEDICAID

## 2025-08-18 DIAGNOSIS — E78.5 HYPERLIPIDEMIA, MILD: Chronic | ICD-10-CM

## 2025-08-18 DIAGNOSIS — E53.8 B12 DEFICIENCY: ICD-10-CM

## 2025-08-18 RX ORDER — CHOLECALCIFEROL (VITAMIN D3) 50 MCG
TABLET ORAL
Qty: 60 TABLET | Refills: 3 | Status: SHIPPED | OUTPATIENT
Start: 2025-08-18

## 2025-08-18 RX ORDER — ROSUVASTATIN CALCIUM 20 MG/1
20 TABLET, COATED ORAL DAILY
Qty: 30 TABLET | Refills: 3 | Status: SHIPPED | OUTPATIENT
Start: 2025-08-18

## 2025-08-26 ENCOUNTER — PATIENT MESSAGE (OUTPATIENT)
Dept: PRIMARY CARE | Facility: CLINIC | Age: 75
End: 2025-08-26

## 2025-08-26 ENCOUNTER — TELEPHONE (OUTPATIENT)
Dept: PRIMARY CARE | Facility: CLINIC | Age: 75
End: 2025-08-26

## 2025-08-26 ENCOUNTER — APPOINTMENT (OUTPATIENT)
Dept: PRIMARY CARE | Facility: CLINIC | Age: 75
End: 2025-08-26
Payer: MEDICAID

## 2025-08-26 VITALS
HEIGHT: 62 IN | WEIGHT: 175.2 LBS | DIASTOLIC BLOOD PRESSURE: 62 MMHG | HEART RATE: 62 BPM | TEMPERATURE: 98.5 F | OXYGEN SATURATION: 98 % | BODY MASS INDEX: 32.24 KG/M2 | SYSTOLIC BLOOD PRESSURE: 128 MMHG

## 2025-08-26 DIAGNOSIS — R55 SYNCOPE AND COLLAPSE: ICD-10-CM

## 2025-08-26 DIAGNOSIS — E55.9 VITAMIN D DEFICIENCY: ICD-10-CM

## 2025-08-26 DIAGNOSIS — I10 BENIGN ESSENTIAL HYPERTENSION: Chronic | ICD-10-CM

## 2025-08-26 DIAGNOSIS — Z12.31 ENCOUNTER FOR SCREENING MAMMOGRAM FOR MALIGNANT NEOPLASM OF BREAST: ICD-10-CM

## 2025-08-26 DIAGNOSIS — R30.0 DYSURIA: ICD-10-CM

## 2025-08-26 DIAGNOSIS — E11.65 TYPE 2 DIABETES MELLITUS WITH HYPERGLYCEMIA, WITHOUT LONG-TERM CURRENT USE OF INSULIN: Chronic | ICD-10-CM

## 2025-08-26 DIAGNOSIS — Z00.00 ROUTINE GENERAL MEDICAL EXAMINATION AT HEALTH CARE FACILITY: Primary | ICD-10-CM

## 2025-08-26 DIAGNOSIS — E53.8 B12 DEFICIENCY: ICD-10-CM

## 2025-08-26 LAB
POC APPEARANCE, URINE: ABNORMAL
POC BILIRUBIN, URINE: NEGATIVE
POC BLOOD, URINE: NEGATIVE
POC COLOR, URINE: ABNORMAL
POC GLUCOSE, URINE: NEGATIVE MG/DL
POC KETONES, URINE: NEGATIVE MG/DL
POC LEUKOCYTES, URINE: ABNORMAL
POC NITRITE,URINE: NEGATIVE
POC PH, URINE: 6 PH
POC PROTEIN, URINE: ABNORMAL MG/DL
POC SPECIFIC GRAVITY, URINE: 1.02
POC UROBILINOGEN, URINE: 0.2 EU/DL

## 2025-08-26 PROCEDURE — 1160F RVW MEDS BY RX/DR IN RCRD: CPT | Performed by: INTERNAL MEDICINE

## 2025-08-26 PROCEDURE — 99214 OFFICE O/P EST MOD 30 MIN: CPT | Performed by: INTERNAL MEDICINE

## 2025-08-26 PROCEDURE — 1036F TOBACCO NON-USER: CPT | Performed by: INTERNAL MEDICINE

## 2025-08-26 PROCEDURE — 1159F MED LIST DOCD IN RCRD: CPT | Performed by: INTERNAL MEDICINE

## 2025-08-26 PROCEDURE — 1170F FXNL STATUS ASSESSED: CPT | Performed by: INTERNAL MEDICINE

## 2025-08-26 PROCEDURE — 3051F HG A1C>EQUAL 7.0%<8.0%: CPT | Performed by: INTERNAL MEDICINE

## 2025-08-26 PROCEDURE — 3074F SYST BP LT 130 MM HG: CPT | Performed by: INTERNAL MEDICINE

## 2025-08-26 PROCEDURE — 81003 URINALYSIS AUTO W/O SCOPE: CPT | Performed by: INTERNAL MEDICINE

## 2025-08-26 PROCEDURE — 99397 PER PM REEVAL EST PAT 65+ YR: CPT | Performed by: INTERNAL MEDICINE

## 2025-08-26 PROCEDURE — 3078F DIAST BP <80 MM HG: CPT | Performed by: INTERNAL MEDICINE

## 2025-08-26 RX ORDER — DULAGLUTIDE 0.75 MG/.5ML
0.75 INJECTION, SOLUTION SUBCUTANEOUS WEEKLY
Qty: 2 ML | Refills: 0 | Status: SHIPPED | OUTPATIENT
Start: 2025-08-26

## 2025-08-26 ASSESSMENT — ENCOUNTER SYMPTOMS
NERVOUS/ANXIOUS: 0
WOUND: 0
UNEXPECTED WEIGHT CHANGE: 0
HEMATURIA: 0
ARTHRALGIAS: 0
HEADACHES: 0
ABDOMINAL PAIN: 0
CHEST TIGHTNESS: 0
SHORTNESS OF BREATH: 0
FEVER: 0
SORE THROAT: 0
COUGH: 0
VOMITING: 0
CONSTIPATION: 0
WHEEZING: 0
FREQUENCY: 0
RHINORRHEA: 0
DIARRHEA: 0
CHILLS: 0
EYE PAIN: 0
MYALGIAS: 0
DYSURIA: 1
PALPITATIONS: 0
DYSPHORIC MOOD: 0
DIZZINESS: 0
BLOOD IN STOOL: 0
NAUSEA: 0
POLYPHAGIA: 0
POLYDIPSIA: 0

## 2025-08-26 ASSESSMENT — ACTIVITIES OF DAILY LIVING (ADL)
TAKING_MEDICATION: NEEDS ASSISTANCE
GROCERY_SHOPPING: TOTAL CARE
MANAGING_FINANCES: TOTAL CARE
BATHING: NEEDS ASSISTANCE
DRESSING: INDEPENDENT
DOING_HOUSEWORK: NEEDS ASSISTANCE

## 2025-08-26 ASSESSMENT — PATIENT HEALTH QUESTIONNAIRE - PHQ9
2. FEELING DOWN, DEPRESSED OR HOPELESS: NOT AT ALL
1. LITTLE INTEREST OR PLEASURE IN DOING THINGS: NOT AT ALL
SUM OF ALL RESPONSES TO PHQ9 QUESTIONS 1 AND 2: 0
1. LITTLE INTEREST OR PLEASURE IN DOING THINGS: NOT AT ALL

## 2025-08-27 LAB
ALBUMIN/CREAT UR: 79 MG/G CREAT
CREAT UR-MCNC: 129 MG/DL (ref 20–275)
MICROALBUMIN UR-MCNC: 10.2 MG/DL

## 2025-08-28 ENCOUNTER — RESULTS FOLLOW-UP (OUTPATIENT)
Dept: PRIMARY CARE | Facility: CLINIC | Age: 75
End: 2025-08-28
Payer: MEDICAID

## 2025-08-28 LAB — BACTERIA UR CULT: NORMAL

## 2025-09-04 DIAGNOSIS — M25.562 CHRONIC PAIN OF LEFT KNEE: ICD-10-CM

## 2025-09-04 DIAGNOSIS — G89.29 CHRONIC PAIN OF LEFT KNEE: ICD-10-CM

## 2025-09-04 DIAGNOSIS — E11.65 TYPE 2 DIABETES MELLITUS WITH HYPERGLYCEMIA, WITHOUT LONG-TERM CURRENT USE OF INSULIN: Chronic | ICD-10-CM

## 2025-09-04 RX ORDER — LIDOCAINE 50 MG/G
PATCH TOPICAL
Qty: 30 PATCH | Refills: 3 | Status: SHIPPED | OUTPATIENT
Start: 2025-09-04

## 2025-09-04 RX ORDER — DULAGLUTIDE 0.75 MG/.5ML
0.75 INJECTION, SOLUTION SUBCUTANEOUS WEEKLY
Qty: 2 ML | Refills: 0 | Status: SHIPPED | OUTPATIENT
Start: 2025-09-04

## 2025-09-16 ENCOUNTER — APPOINTMENT (OUTPATIENT)
Dept: PHARMACY | Facility: HOSPITAL | Age: 75
End: 2025-09-16
Payer: MEDICAID

## 2025-09-19 ENCOUNTER — APPOINTMENT (OUTPATIENT)
Dept: CARDIOLOGY | Facility: CLINIC | Age: 75
End: 2025-09-19
Payer: MEDICAID

## 2025-12-08 ENCOUNTER — APPOINTMENT (OUTPATIENT)
Dept: PRIMARY CARE | Facility: CLINIC | Age: 75
End: 2025-12-08
Payer: MEDICAID